# Patient Record
Sex: FEMALE | Race: BLACK OR AFRICAN AMERICAN | NOT HISPANIC OR LATINO | Employment: FULL TIME | URBAN - METROPOLITAN AREA
[De-identification: names, ages, dates, MRNs, and addresses within clinical notes are randomized per-mention and may not be internally consistent; named-entity substitution may affect disease eponyms.]

---

## 2017-01-04 ENCOUNTER — LAB VISIT (OUTPATIENT)
Dept: LAB | Facility: HOSPITAL | Age: 57
End: 2017-01-04
Attending: FAMILY MEDICINE
Payer: COMMERCIAL

## 2017-01-04 ENCOUNTER — OFFICE VISIT (OUTPATIENT)
Dept: FAMILY MEDICINE | Facility: CLINIC | Age: 57
End: 2017-01-04
Payer: COMMERCIAL

## 2017-01-04 VITALS
SYSTOLIC BLOOD PRESSURE: 108 MMHG | HEART RATE: 64 BPM | BODY MASS INDEX: 26.69 KG/M2 | TEMPERATURE: 98 F | DIASTOLIC BLOOD PRESSURE: 62 MMHG | HEIGHT: 68 IN | WEIGHT: 176.13 LBS

## 2017-01-04 DIAGNOSIS — E89.0 POSTABLATIVE HYPOTHYROIDISM: ICD-10-CM

## 2017-01-04 DIAGNOSIS — Z00.00 ANNUAL PHYSICAL EXAM: Primary | ICD-10-CM

## 2017-01-04 DIAGNOSIS — Z12.4 SCREENING FOR CERVICAL CANCER: ICD-10-CM

## 2017-01-04 DIAGNOSIS — Z12.31 OTHER SCREENING MAMMOGRAM: ICD-10-CM

## 2017-01-04 DIAGNOSIS — R73.09 ELEVATED GLUCOSE: ICD-10-CM

## 2017-01-04 DIAGNOSIS — Z12.11 SCREEN FOR COLON CANCER: ICD-10-CM

## 2017-01-04 DIAGNOSIS — E78.00 HYPERCHOLESTEROLEMIA: ICD-10-CM

## 2017-01-04 PROCEDURE — 99396 PREV VISIT EST AGE 40-64: CPT | Mod: S$GLB,,, | Performed by: FAMILY MEDICINE

## 2017-01-04 PROCEDURE — 88175 CYTOPATH C/V AUTO FLUID REDO: CPT

## 2017-01-04 PROCEDURE — 99999 PR PBB SHADOW E&M-EST. PATIENT-LVL III: CPT | Mod: PBBFAC,,, | Performed by: FAMILY MEDICINE

## 2017-01-04 PROCEDURE — 83036 HEMOGLOBIN GLYCOSYLATED A1C: CPT

## 2017-01-04 PROCEDURE — 36415 COLL VENOUS BLD VENIPUNCTURE: CPT | Mod: PO

## 2017-01-04 RX ORDER — PRAVASTATIN SODIUM 40 MG/1
40 TABLET ORAL DAILY
Qty: 90 TABLET | Refills: 3 | Status: SHIPPED | OUTPATIENT
Start: 2017-01-04 | End: 2018-02-21 | Stop reason: SDUPTHER

## 2017-01-04 RX ORDER — LEVOTHYROXINE SODIUM 112 UG/1
112 TABLET ORAL EVERY MORNING
Qty: 90 TABLET | Refills: 3 | Status: SHIPPED | OUTPATIENT
Start: 2017-01-04 | End: 2018-02-21 | Stop reason: SDUPTHER

## 2017-01-04 NOTE — MR AVS SNAPSHOT
[facility]  [address], [address]  [facility] [address]  Phone: [phone]  Fax: [phone]                  Shara Combs   2017 2:00 PM   Office Visit    Description:  Female : 1960   Provider:  Josey Laguerre MD   Department:  [facility]           Reason for Visit     Annual Exam           Diagnoses this Visit        Comments    Annual physical exam    -  Primary     Screening for cervical cancer         Hypercholesterolemia         Postablative hypothyroidism         Elevated glucose         Other screening mammogram                To Do List           Goals (5 Years of Data)     None       These Medications        Disp Refills Start End    levothyroxine (SYNTHROID) 112 MCG tablet 90 tablet 3 2017     Take 1 tablet (112 mcg total) by mouth every morning. - Oral    Pharmacy: [id number] - [facility] [address] Ph #: [phone]       pravastatin (PRAVACHOL) 40 MG tablet 90 tablet 3 2017     Take 1 tablet (40 mg total) by mouth once daily. - Oral    Pharmacy: [id number] - [facility] [address] Ph #: [phone]         [facility]     [facility] Care [facility] -  Assistance  Registered nurses in the [facility] provide clinical advisement, health education, appointment booking, and other advisory services.  Call for this free service at [phone].             Medications           Message regarding Medications     Verify the changes and/or additions to your medication regime listed below are the same as discussed with your clinician today.  If any of these changes or additions are incorrect, please notify your healthcare provider.        CHANGE how you are taking these medications     Start Taking Instead of    pravastatin (PRAVACHOL) 40 MG tablet pravastatin (PRAVACHOL) 40 MG tablet    Dosage:  Take 1 tablet (40 mg total) by mouth  "once daily. Dosage:  TAKE 1 TABLET BY MOUTH EVERY DAY    Reason for Change:  Reorder       STOP taking these medications     fluoxetine (PROZAC) 10 MG capsule Take 1 capsule (10 mg total) by mouth once daily.           Verify that the below list of medications is an accurate representation of the medications you are currently taking.  If none reported, the list may be blank. If incorrect, please contact your healthcare provider. Carry this list with you in case of emergency.           Current Medications     levothyroxine (SYNTHROID) 112 MCG tablet Take 1 tablet (112 mcg total) by mouth every morning.    pravastatin (PRAVACHOL) 40 MG tablet Take 1 tablet (40 mg total) by mouth once daily.           Clinical Reference Information           Vital Signs - Last Recorded  Most recent update: 1/4/2017  2:11 PM by Ana Alex MA    BP Pulse Temp Ht Wt BMI    108/62 (BP Location: Right arm) 64 98.4 °F (36.9 °C) 5' 8" (1.727 m) 79.9 kg (176 lb 2.4 oz) 26.78 kg/m2      Blood Pressure          Most Recent Value    BP  108/62      Allergies as of 1/4/2017     No Known Allergies      Immunizations Administered on Date of Encounter - 1/4/2017     None      Orders Placed During Today's Visit      Normal Orders This Visit    Liquid-based pap smear, screening     Future Labs/Procedures Expected by Expires    Hemoglobin A1c  1/4/2017 1/4/2018    Mammo Digital Screening Bilat with CAD  1/4/2017 3/6/2018      Instructions         I'd like to advise you on current American Cancer Society and American Gynecological Society recommendations. If all previous Pap smears have been normal, no current problems, and no family history of female cancers, it is recommended to have Pap smear every 3 years (or after 29 yo, every 5 years with HPV co-testing). Mammograms every 2-3 years. Of course, I can perform this sooner if any problems or if she desires.    RECOMMENDATIONS FOR FEMALES    Prevent the #1 cause of death- cardiovascular disease (HEART " ATTACK & STROKE) by checking for normal blood pressure, cholesterol, sugars, & by not smoking.     Yearly FLU shot, ONE pneumonia shot after 65, ONE Shingles vaccine after 60    Screening colonoscopy at AGE  50 & every 10 years to check for COLON CANCER,  one of the most common & preventable cancers. Repeat in 3 years if POLYP found    I recommend  high fiber (5 fresh fruits or vegetables daily), low fat diet and aerobic  exercise (huffing/ puffing/ sweating for 20 min straight at least 4 days a week)    Follow up yearly with mammogram (every 2 years) , fasting lipids, CMP, CBC, TSH prior.

## 2017-01-04 NOTE — PATIENT INSTRUCTIONS
I'd like to advise you on current American Cancer Society and American Gynecological Society recommendations. If all previous Pap smears have been normal, no current problems, and no family history of female cancers, it is recommended to have Pap smear every 3 years (or after 29 yo, every 5 years with HPV co-testing). Mammograms every 2-3 years. Of course, I can perform this sooner if any problems or if she desires.    RECOMMENDATIONS FOR FEMALES    Prevent the #1 cause of death- cardiovascular disease (HEART ATTACK & STROKE) by checking for normal blood pressure, cholesterol, sugars, & by not smoking.     Yearly FLU shot, ONE pneumonia shot after 65, ONE Shingles vaccine after 60    Screening colonoscopy at AGE  50 & every 10 years to check for COLON CANCER,  one of the most common & preventable cancers. Repeat in 3 years if POLYP found    I recommend  high fiber (5 fresh fruits or vegetables daily), low fat diet and aerobic  exercise (huffing/ puffing/ sweating for 20 min straight at least 4 days a week)    Follow up yearly with mammogram (every 2 years) , fasting lipids, CMP, CBC, TSH prior.

## 2017-01-04 NOTE — PROGRESS NOTES
"Subjective:       Patient ID: Shara Combs is a 56 y.o. female.    Chief Complaint: Annual Exam    Here today for general exam.     Denies any chest pain, shortness of breath, nausea vomiting constipation diarrhea, blood in stool, heartburn    CBC, CMP, TSH,  within normal limits, except GLUCOSE 117 fasting    Total cholesterol 208,  with pravasttin 40    st opped fluoxetine but seeing therapist    Current Outpatient Prescriptions on File Prior to Visit   Medication Sig         levothyroxine (SYNTHROID) 112 MCG tablet Take 1 tablet (112 mcg total) by mouth every morning.   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   HENT:   Right Ear: External ear normal.   Left Ear: External ear normal.   Nose: Nose normal.   Mouth/Throat: Oropharynx is clear and moist.   Eyes: EOM are normal. Pupils are equal, round, and reactive to light.   Cardiovascular: Normal rate, regular rhythm and normal heart sounds.    No murmur heard.  Pulmonary/Chest: Breath sounds normal. She has no wheezes. She has no rales. Right " breast exhibits no mass, no nipple discharge, no skin change and no tenderness. Left breast exhibits no mass, no nipple discharge, no skin change and no tenderness.   Abdominal: Soft. Bowel sounds are normal. She exhibits no distension and no mass. There is no hepatosplenomegaly. There is no tenderness. There is no rebound and no guarding.   Genitourinary: Vagina normal and uterus normal. No breast tenderness. Pelvic exam was performed with patient supine. Right adnexum displays no mass and no tenderness. Left adnexum displays no mass and no tenderness. No vaginal discharge found.   Lymphadenopathy:     She has no cervical adenopathy.     She has no axillary adenopathy.        Right: No supraclavicular adenopathy present.        Left: No supraclavicular adenopathy present.   Skin: No rash noted.       Assessment:       1. Annual physical exam    2. Screening for cervical cancer    3. Hypercholesterolemia    4. Postablative hypothyroidism    5. Elevated glucose    6. Other screening mammogram    7. Screen for colon cancer        Plan:       Orders Placed This Encounter    Mammo Digital Screening Bilat with CAD    Hemoglobin A1c    Liquid-based pap smear, screening    levothyroxine (SYNTHROID) 112 MCG tablet    pravastatin (PRAVACHOL) 40 MG tablet    Case request GI: COLONOSCOPY       Patient Instructions        I'd like to advise you on current American Cancer Society and American Gynecological Society recommendations. If all previous Pap smears have been normal, no current problems, and no family history of female cancers, it is recommended to have Pap smear every 3 years (or after 29 yo, every 5 years with HPV co-testing). Mammograms every 2-3 years. Of course, I can perform this sooner if any problems or if she desires.    RECOMMENDATIONS FOR FEMALES    Prevent the #1 cause of death- cardiovascular disease (HEART ATTACK & STROKE) by checking for normal blood pressure, cholesterol, sugars, & by not smoking.      Yearly FLU shot, ONE pneumonia shot after 65, ONE Shingles vaccine after 60    Screening colonoscopy at AGE  50 & every 10 years to check for COLON CANCER,  one of the most common & preventable cancers. Repeat in 3 years if POLYP found    I recommend  high fiber (5 fresh fruits or vegetables daily), low fat diet and aerobic  exercise (huffing/ puffing/ sweating for 20 min straight at least 4 days a week)    Follow up yearly with mammogram (every 2 years) , fasting lipids, CMP, CBC, TSH prior.

## 2017-01-05 ENCOUNTER — TELEPHONE (OUTPATIENT)
Dept: FAMILY MEDICINE | Facility: CLINIC | Age: 57
End: 2017-01-05

## 2017-01-05 DIAGNOSIS — R73.09 ELEVATED GLUCOSE: Primary | ICD-10-CM

## 2017-01-05 LAB
ESTIMATED AVG GLUCOSE: 131 MG/DL
HBA1C MFR BLD HPLC: 6.2 %

## 2017-01-05 NOTE — TELEPHONE ENCOUNTER
Hello    Your sugar test is elevated & is in the PRE- DIABETES range 6.2%     If you could walk around the block once a day, your sugars would decrease.     Remember, all white bread, white flour (used in baking)  white pasta, white rice, white potatoes, chips, crackers, cookies, sweets, sodas (even Gatorade, Powerade,Koolaid) , desserts ----TURN INTO SUGAR.     See me in 6 months - with BLOODWORK BEFORE you see me to recheck for DIABETES

## 2017-01-11 ENCOUNTER — TELEPHONE (OUTPATIENT)
Dept: FAMILY MEDICINE | Facility: CLINIC | Age: 57
End: 2017-01-11

## 2017-01-11 NOTE — TELEPHONE ENCOUNTER
Dr. Laguerre's msg reviewed with pt.  Understanding verbalized.  Pt having internet problems and is not able currently to access her [facility] msg.  Offered to schedule f/u appt.  Pt declined stating that she will call back to schedule f/u appt after checking her schedule.

## 2017-01-11 NOTE — TELEPHONE ENCOUNTER
----- Message from Asuncion Jaffe sent at 1/10/2017  4:37 PM CST -----  Contact: [place]/[phone] cell  Type: Test Results    What test was performed?Lab work    Who ordered the test?    When and where were the test performed? 01/04/2017 [place]    Comments:Patient is calling to request test results. Please call and advise.    Thank you!

## 2017-01-11 NOTE — TELEPHONE ENCOUNTER
Hga1c is elevated 6.2%. Follow up with me to discuss prediabetes    Pap smear normal.     Please recommend she check [facility] messages bc I have released her labs & pap results

## 2017-02-08 ENCOUNTER — OFFICE VISIT (OUTPATIENT)
Dept: FAMILY MEDICINE | Facility: CLINIC | Age: 57
End: 2017-02-08
Payer: COMMERCIAL

## 2017-02-08 VITALS
BODY MASS INDEX: 26.6 KG/M2 | HEIGHT: 68 IN | WEIGHT: 175.5 LBS | HEART RATE: 64 BPM | TEMPERATURE: 99 F | SYSTOLIC BLOOD PRESSURE: 112 MMHG | DIASTOLIC BLOOD PRESSURE: 78 MMHG

## 2017-02-08 DIAGNOSIS — R73.03 PREDIABETES: ICD-10-CM

## 2017-02-08 DIAGNOSIS — E78.00 HYPERCHOLESTEROLEMIA: Primary | ICD-10-CM

## 2017-02-08 PROBLEM — R73.09 ELEVATED GLUCOSE: Status: RESOLVED | Noted: 2017-01-04 | Resolved: 2017-02-08

## 2017-02-08 LAB
CREAT UR-MCNC: 121 MG/DL
MICROALBUMIN UR DL<=1MG/L-MCNC: 4 UG/ML
MICROALBUMIN/CREATININE RATIO: 3.3 UG/MG

## 2017-02-08 PROCEDURE — 99999 PR PBB SHADOW E&M-EST. PATIENT-LVL III: CPT | Mod: PBBFAC,,, | Performed by: FAMILY MEDICINE

## 2017-02-08 PROCEDURE — 82570 ASSAY OF URINE CREATININE: CPT

## 2017-02-08 PROCEDURE — 99214 OFFICE O/P EST MOD 30 MIN: CPT | Mod: S$GLB,,, | Performed by: FAMILY MEDICINE

## 2017-02-08 NOTE — PROGRESS NOTES
Subjective:      Patient ID: Shara Combs is a 56 y.o. female.    Chief Complaint: Results    Here today for new diagnosis prediabetes. Her mom had diabetes    Denies acute symptoms such as nocturia, polyuria, unexplained weight loss or blurred vision.    Last eye evaluation years    Last urine microalbumin today    Denies numbness, tingling sensation, or known h/o peripheral neuropathy.     She is taking pravastatin 40    Lab Results   Component Value Date    HGBA1C 6.2 2017     No results found for: MICROALBUR  Lab Results   Component Value Date    LDLCALC 127.4 2016    LDLCALC 129.8 2014    CHOL 208 (H) 2016    HDL 51 2016    TRIG 148 2016       Lab Results   Component Value Date     2016    K 3.9 2016     2016    CO2 27 2016     (H) 2016    BUN 14 2016    CREATININE 0.9 2016    CALCIUM 9.3 2016    PROT 7.4 2016    ALBUMIN 3.8 2016    BILITOT 0.2 2016    ALKPHOS 72 2016    AST 36 2016    ALT 23 2016    ANIONGAP 8 2016    ESTGFRAFRICA >60.0 2016    EGFRNONAA >60.0 2016    WBC 7.12 2016    HGB 11.7 (L) 2016    HCT 37.4 2016    MCV 69 (L) 2016     2016    TSH 2.284 2016         Current Outpatient Prescriptions on File Prior to Visit   Medication Sig    levothyroxine (SYNTHROID) 112 MCG tablet Take 1 tablet (112 mcg total) by mouth every morning.    pravastatin (PRAVACHOL) 40 MG tablet Take 1 tablet (40 mg total) by mouth once daily.     No current facility-administered medications on file prior to visit.      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 She appears well-developed and well-nourished.   HENT:   Head: Normocephalic and atraumatic.   Right Ear: External ear normal.   Left Ear: External ear normal.   Nose: Nose normal.   Mouth/Throat: Oropharynx is clear and moist.   Eyes: EOM are normal. Pupils are equal, round, and reactive to light.   Neck: Neck supple. No thyromegaly present.   Cardiovascular: Normal rate, regular rhythm, normal heart sounds and intact distal pulses.    No murmur heard.  Pulmonary/Chest: Effort normal and breath sounds normal. She has no wheezes.   Abdominal: Soft. Bowel sounds are normal. She exhibits no distension and no mass. There is no tenderness. There is no rebound and no guarding.   Musculoskeletal: She exhibits no edema.        Right foot: There is normal range of motion and no deformity.        Left foot: There is normal range of motion and no deformity.        Feet:   Right Foot:   Protective Sensation: 5 sites tested. 5 sites sensed.   Skin Integrity: Negative for ulcer, blister, skin breakdown, erythema or warmth.   Left Foot:   Protective Sensation: 5 sites tested. 5 sites sensed.   Skin Integrity: Negative for ulcer, blister, skin breakdown, erythema or warmth.   Lymphadenopathy:     She has no cervical adenopathy.   Neurological: She is alert and oriented to person, place, and time.   Skin: Skin is warm and dry.   Psychiatric: She has a " normal mood and affect. Her behavior is normal.     Assessment:     1. Hypercholesterolemia    2. Prediabetes      Plan:     Orders Placed This Encounter    Hemoglobin A1c     3 months of focusing on diet & exercise.     Consider diabetic teaching if Hga1c is elevated at follow up    Goal LDL < 100  Consider ACE   Patient Instructions     ===================================  Continue other medications    The future risks of uncontrolled diabetes - include heart attack, stroke, neuropathy (pain in hands & feet that could lead to infection and amputation), nephropathy (leading to kidney dialysis) , and blindness     Always wear protective shoes.     Focus on the American Diabetic Association diet, high fiber, low fat diet, exercise  - huffing & puffing for 20 minutes most days of the week    Focus on NO SUGAR and no sugar substitutes (splenda, etc) IN YOUR DRINKS. With respect to food - LOW CARBOHYDRATES - switch to whole wheat & brown rice.    Decrease & try to stop ALCOHOL, WHITE BREAD, WHITE PASTA, WHITE RICE , WHITE POTATOES- which all turn into sugar.    EAT an EXTRA VEGETABLE A DAY than you already do.    The ADA recommends taking ----------(we will discuss this at upcoming visits)  1. Aspirin 81 mg daily (unless you have had bleeding stomach ulcers or allergy to this)  2. STATIN medication (atorvastatin, pravastatin, rosuvastatin) to decrease inflammation in your blood vessels caused by SUGAR to prevent future heart attack & stroke. This is recommended even if your LDL cholesterol is <100.   3. ACE/ ARB blood pressure medication (Losartan, Lisinopril) to protect your kidneys from future dialysis from SUGAR. This is recommended even if you have normal blood pressure    Once YEARLY I will check basic labs CBC, CMP, fasting lipids, TSH, Hga1C prior to your visit      ----------------------------

## 2017-02-08 NOTE — PATIENT INSTRUCTIONS
FOLLOW UP REMINDER for DIABETICS:  ===================================  If you have DIABETES, we should evaluate your blood test every 3 MONTHS if your Hg1c is >6.5% OR if you use INSULIN.     We can evaluate you every 6 MONTHS if your Hga1c is < 6.5% (contolled)  ===================================  Continue other medications    The future risks of uncontrolled diabetes - include heart attack, stroke, neuropathy (pain in hands & feet that could lead to infection and amputation), nephropathy (leading to kidney dialysis) , and blindness     To prevent complications that can be treated early, please see your  opthalmologist EYE DOCTOR YEARLY      Always wear protective shoes.     Focus on the American Diabetic Association diet, high fiber, low fat diet, exercise  - huffing & puffing for 20 minutes most days of the week    Focus on NO SUGAR and no sugar substitutes (splenda, etc) IN YOUR DRINKS. With respect to food - LOW CARBOHYDRATES - switch to whole wheat & brown rice.    Decrease & try to stop ALCOHOL, WHITE BREAD, WHITE PASTA, WHITE RICE , WHITE POTATOES- which all turn into sugar.    EAT an EXTRA VEGETABLE A DAY than you already do.    The ADA recommends taking  1. Aspirin 81 mg daily (unless you have had bleeding stomach ulcers or allergy to this)  2. STATIN medication (atorvastatin, pravastatin, rosuvastatin) to decrease inflammation in your blood vessels caused by SUGAR to prevent future heart attack & stroke. This is recommended even if your LDL cholesterol is <100.   3. ACE/ ARB blood pressure medication (Losartan, Lisinopril) to protect your kidneys from future dialysis from SUGAR. This is recommended even if you have normal blood pressure    Once YEARLY I will check basic labs CBC, CMP, fasting lipids, TSH, Hga1C prior to your visit      ----------------------------

## 2017-02-08 NOTE — MR AVS SNAPSHOT
"    [facility]  [address], [address]  [facility] [address]  Phone: [phone]  Fax: [phone]                  Shara Combs   2017 8:20 AM   Office Visit    Description:  Female : 1960   Provider:  Josey Laguerre MD   Department:  [facility]           Reason for Visit     Results           Diagnoses this Visit        Comments    Prediabetes                To Do List           Goals (5 Years of Data)     None      Follow-Up and Disposition     Return in about 3 months (around 2017) for prediabetes, Hga1c prior.      [facility]     [facility] Care [facility] -  Assistance  Registered nurses in the [facility] provide clinical advisement, health education, appointment booking, and other advisory services.  Call for this free service at [phone].             Medications           Message regarding Medications     Verify the changes and/or additions to your medication regime listed below are the same as discussed with your clinician today.  If any of these changes or additions are incorrect, please notify your healthcare provider.             Verify that the below list of medications is an accurate representation of the medications you are currently taking.  If none reported, the list may be blank. If incorrect, please contact your healthcare provider. Carry this list with you in case of emergency.           Current Medications     levothyroxine (SYNTHROID) 112 MCG tablet Take 1 tablet (112 mcg total) by mouth every morning.    pravastatin (PRAVACHOL) 40 MG tablet Take 1 tablet (40 mg total) by mouth once daily.           Clinical Reference Information           Your Vitals Were     BP Pulse Temp Height Weight BMI    112/78 (BP Location: Right arm) 64 98.7 °F (37.1 °C) 5' 7.5" (1.715 m) 79.6 kg (175 lb 7.8 oz) 27.08 kg/m2      Blood Pressure          Most Recent Value    BP  112/78      Allergies as of " 2/8/2017     No Known Allergies      Immunizations Administered on Date of Encounter - 2/8/2017     None      Orders Placed During Today's Visit     Future Labs/Procedures Expected by Expires    Hemoglobin A1c  5/8/2017 4/9/2018      Instructions        FOLLOW UP REMINDER for DIABETICS:  ===================================  If you have DIABETES, we should evaluate your blood test every 3 MONTHS if your Hg1c is >6.5% OR if you use INSULIN.     We can evaluate you every 6 MONTHS if your Hga1c is < 6.5% (contolled)  ===================================  Continue other medications    The future risks of uncontrolled diabetes - include heart attack, stroke, neuropathy (pain in hands & feet that could lead to infection and amputation), nephropathy (leading to kidney dialysis) , and blindness     To prevent complications that can be treated early, please see your  opthalmologist EYE DOCTOR YEARLY      Always wear protective shoes.     Focus on the American Diabetic Association diet, high fiber, low fat diet, exercise  - huffing & puffing for 20 minutes most days of the week    Focus on NO SUGAR and no sugar substitutes (splenda, etc) IN YOUR DRINKS. With respect to food - LOW CARBOHYDRATES - switch to whole wheat & brown rice.    Decrease & try to stop ALCOHOL, WHITE BREAD, WHITE PASTA, WHITE RICE , WHITE POTATOES- which all turn into sugar.    EAT an EXTRA VEGETABLE A DAY than you already do.    The ADA recommends taking  1. Aspirin 81 mg daily (unless you have had bleeding stomach ulcers or allergy to this)  2. STATIN medication (atorvastatin, pravastatin, rosuvastatin) to decrease inflammation in your blood vessels caused by SUGAR to prevent future heart attack & stroke. This is recommended even if your LDL cholesterol is <100.   3. ACE/ ARB blood pressure medication (Losartan, Lisinopril) to protect your kidneys from future dialysis from SUGAR. This is recommended even if you have normal blood pressure    Once YEARLY I  will check basic labs CBC, CMP, fasting lipids, TSH, Hga1C prior to your visit      ----------------------------             Language Assistance Services     ATTENTION: Language assistance services are available, free of charge. Please call [phone].      ATENCIÓN: Si habla eyal, tiene a belle disposición servicios gratuitos de asistencia lingüística. Llame al [phone].     ROGER Ý: N?u b?n nói Ti?ng Vi?t, có các d?ch v? h? tr? ngôn ng? mi?n phí dành cho b?n. G?i s? [phone].         [facility] complies with applicable Federal civil rights laws and does not discriminate on the basis of race, color, national origin, age, disability, or sex.

## 2017-05-04 ENCOUNTER — LAB VISIT (OUTPATIENT)
Dept: LAB | Facility: HOSPITAL | Age: 57
End: 2017-05-04
Attending: FAMILY MEDICINE

## 2017-05-04 DIAGNOSIS — R73.03 PREDIABETES: ICD-10-CM

## 2017-05-04 LAB
ESTIMATED AVG GLUCOSE: 134 MG/DL
HBA1C MFR BLD HPLC: 6.3 %

## 2017-05-04 PROCEDURE — 36415 COLL VENOUS BLD VENIPUNCTURE: CPT

## 2017-05-04 PROCEDURE — 83036 HEMOGLOBIN GLYCOSYLATED A1C: CPT

## 2017-05-09 ENCOUNTER — OFFICE VISIT (OUTPATIENT)
Dept: FAMILY MEDICINE | Facility: CLINIC | Age: 57
End: 2017-05-09
Payer: COMMERCIAL

## 2017-05-09 VITALS
SYSTOLIC BLOOD PRESSURE: 114 MMHG | HEIGHT: 66 IN | HEART RATE: 68 BPM | WEIGHT: 165.38 LBS | DIASTOLIC BLOOD PRESSURE: 84 MMHG | TEMPERATURE: 98 F | BODY MASS INDEX: 26.58 KG/M2

## 2017-05-09 DIAGNOSIS — E78.00 HYPERCHOLESTEROLEMIA: ICD-10-CM

## 2017-05-09 PROBLEM — R73.03 PREDIABETES: Status: RESOLVED | Noted: 2017-02-08 | Resolved: 2017-05-09

## 2017-05-09 PROCEDURE — 4010F ACE/ARB THERAPY RXD/TAKEN: CPT | Mod: S$GLB,,, | Performed by: FAMILY MEDICINE

## 2017-05-09 PROCEDURE — 99999 PR PBB SHADOW E&M-EST. PATIENT-LVL III: CPT | Mod: PBBFAC,,, | Performed by: FAMILY MEDICINE

## 2017-05-09 PROCEDURE — 99212 OFFICE O/P EST SF 10 MIN: CPT | Mod: S$GLB,,, | Performed by: FAMILY MEDICINE

## 2017-05-09 PROCEDURE — 1160F RVW MEDS BY RX/DR IN RCRD: CPT | Mod: S$GLB,,, | Performed by: FAMILY MEDICINE

## 2017-05-09 PROCEDURE — 3044F HG A1C LEVEL LT 7.0%: CPT | Mod: S$GLB,,, | Performed by: FAMILY MEDICINE

## 2017-05-09 RX ORDER — LOSARTAN POTASSIUM 25 MG/1
25 TABLET ORAL DAILY
Qty: 30 TABLET | Refills: 12 | Status: SHIPPED | OUTPATIENT
Start: 2017-05-09 | End: 2017-09-08 | Stop reason: SDUPTHER

## 2017-05-09 RX ORDER — METFORMIN HYDROCHLORIDE 500 MG/1
500 TABLET, EXTENDED RELEASE ORAL
Qty: 30 TABLET | Refills: 12 | Status: SHIPPED | OUTPATIENT
Start: 2017-05-09 | End: 2017-07-25 | Stop reason: SDUPTHER

## 2017-05-09 NOTE — MR AVS SNAPSHOT
[facility]  [address], [address]  [facility] [address]  Phone: [phone]  Fax: [phone]                  Shara Tayloriggins   2017 3:20 PM   Office Visit    Description:  Female : 1960   Provider:  Josey Laguerre MD   Department:  [facility]           Reason for Visit     Follow-up           Diagnoses this Visit        Comments    Hypercholesterolemia    -  Primary     Uncontrolled type 2 diabetes mellitus without complication, without long-term current use of insulin                To Do List           Goals (5 Years of Data)     None      Follow-Up and Disposition     Return in about 3 months (around 2017) for dm, juan STACY.       These Medications        Disp Refills Start End    losartan (COZAAR) 25 MG tablet 30 tablet 12 2017    Take 1 tablet (25 mg total) by mouth once daily. - Oral    Pharmacy: [facility]/pharmacy #[id number] [place], [place] - [address] Ph #: [phone]       metformin (GLUCOPHAGE-XR) 500 MG 24 hr tablet 30 tablet 12 2017    Take 1 tablet (500 mg total) by mouth daily with breakfast. - Oral    Pharmacy: [facility]/pharmacy #[id number] [place], [place] - [address] Ph #: [phone]         [facility]     [facility] -  Assistance  Unless otherwise directed by your provider, please contact [facility], our nurse care line that is available for  assistance.     Registered nurses in the [facility] provide: appointment scheduling, clinical advisement, health education, and other advisory services.  Call: [phone] (toll free)               Medications           Message regarding Medications     Verify the changes and/or additions to your medication regime listed below are the same as discussed with your clinician today.  If any of these changes or additions are incorrect, please notify your healthcare provider.        START taking these  "NEW medications        Refills    losartan (COZAAR) 25 MG tablet 12    Sig: Take 1 tablet (25 mg total) by mouth once daily.    Class: Normal    Route: Oral    metformin (GLUCOPHAGE-XR) 500 MG 24 hr tablet 12    Sig: Take 1 tablet (500 mg total) by mouth daily with breakfast.    Class: Normal    Route: Oral           Verify that the below list of medications is an accurate representation of the medications you are currently taking.  If none reported, the list may be blank. If incorrect, please contact your healthcare provider. Carry this list with you in case of emergency.           Current Medications     levothyroxine (SYNTHROID) 112 MCG tablet Take 1 tablet (112 mcg total) by mouth every morning.    losartan (COZAAR) 25 MG tablet Take 1 tablet (25 mg total) by mouth once daily.    metformin (GLUCOPHAGE-XR) 500 MG 24 hr tablet Take 1 tablet (500 mg total) by mouth daily with breakfast.    pravastatin (PRAVACHOL) 40 MG tablet Take 1 tablet (40 mg total) by mouth once daily.           Clinical Reference Information           Your Vitals Were     BP Pulse Temp Height Weight BMI    114/84 (BP Location: Right arm) 68 98.4 °F (36.9 °C) 5' 6" (1.676 m) 75 kg (165 lb 5.5 oz) 26.69 kg/m2      Blood Pressure          Most Recent Value    BP  114/84      Allergies as of 5/9/2017     No Known Allergies      Immunizations Administered on Date of Encounter - 5/9/2017     None      Orders Placed During Today's Visit      Normal Orders This Visit    Ambulatory Referral to Diabetes Education     Future Labs/Procedures Expected by Expires    Hemoglobin A1c  8/9/2017 7/8/2018      Instructions        FOLLOW UP REMINDER for DIABETICS:  ===================================  If you have DIABETES, we should evaluate your blood test every 3 MONTHS if your Hg1c is >6.5% OR if you use INSULIN.     We can evaluate you every 6 MONTHS if your Hga1c is < 6.5% (contolled)  ===================================  Continue other medications    The " future risks of uncontrolled diabetes - include heart attack, stroke, neuropathy (pain in hands & feet that could lead to infection and amputation), nephropathy (leading to kidney dialysis) , and blindness     To prevent complications that can be treated early, please see your  opthalmologist EYE DOCTOR YEARLY      Always wear protective shoes.     Focus on the American Diabetic Association diet, high fiber, low fat diet, exercise  - huffing & puffing for 20 minutes most days of the week    Focus on NO SUGAR and no sugar substitutes (splenda, etc) IN YOUR DRINKS. With respect to food - LOW CARBOHYDRATES - switch to whole wheat & brown rice.    Decrease & try to stop ALCOHOL, WHITE BREAD, WHITE PASTA, WHITE RICE , WHITE POTATOES- which all turn into sugar.    EAT an EXTRA VEGETABLE A DAY than you already do.    The ADA recommends taking  1. Aspirin 81 mg daily (unless you have had bleeding stomach ulcers or allergy to this)  2. STATIN medication (atorvastatin, pravastatin, rosuvastatin) to decrease inflammation in your blood vessels caused by SUGAR to prevent future heart attack & stroke. This is recommended even if your LDL cholesterol is <100.   3. ACE/ ARB blood pressure medication (Losartan, Lisinopril) to protect your kidneys from future dialysis from SUGAR. This is recommended even if you have normal blood pressure    Once YEARLY I will check basic labs CBC, CMP, fasting lipids, TSH, Hga1C prior to your visit      ----------------------------             Language Assistance Services     ATTENTION: Language assistance services are available, free of charge. Please call [phone].      ATENCIÓN: Si habla eyal, tiene a belle disposición servicios gratuitos de asistencia lingüística. Llame al [phone].     CHÚ Ý: N?u b?n nói Ti?ng Vi?t, có các d?ch v? h? tr? ngôn ng? mi?n phí dành cho b?n. G?i s? [phone].         [facility] complies with applicable Federal civil rights laws and  does not discriminate on the basis of race, color, national origin, age, disability, or sex.

## 2017-05-09 NOTE — PROGRESS NOTES
Subjective:      Patient ID: Shara Combs is a 56 y.o. female.    Chief Complaint: Follow-up (3 month)    Here today for diabetes mellitus 6.3% , new dx. she is frustrated because she has been exercising 4 days a week in the gym, biking, eating less bread, pasta, rice, soda.    Denies acute symptoms such as nocturia, polyuria, unexplained weight loss or blurred vision.    Last eye evaluation years    Last urine microalbumin normal Feb 2017    Denies numbness, tingling sensation, or known h/o peripheral neuropathy.     Denies  Chest pain, shortness of breath.      Lab Results   Component Value Date    HGBA1C 6.3 (H) 05/04/2017    HGBA1C 6.2 01/04/2017     No results found for: MICROALBUR  Lab Results   Component Value Date    LDLCALC 127.4 12/28/2016    LDLCALC 129.8 01/31/2014    CHOL 208 (H) 12/28/2016    HDL 51 12/28/2016    TRIG 148 12/28/2016       Lab Results   Component Value Date     12/28/2016    K 3.9 12/28/2016     12/28/2016    CO2 27 12/28/2016     (H) 12/28/2016    BUN 14 12/28/2016    CREATININE 0.9 12/28/2016    CALCIUM 9.3 12/28/2016    PROT 7.4 12/28/2016    ALBUMIN 3.8 12/28/2016    BILITOT 0.2 12/28/2016    ALKPHOS 72 12/28/2016    AST 36 12/28/2016    ALT 23 12/28/2016    ANIONGAP 8 12/28/2016    ESTGFRAFRICA >60.0 12/28/2016    EGFRNONAA >60.0 12/28/2016    WBC 7.12 12/28/2016    HGB 11.7 (L) 12/28/2016    HCT 37.4 12/28/2016    MCV 69 (L) 12/28/2016     12/28/2016    TSH 2.284 12/28/2016         Current Outpatient Prescriptions on File Prior to Visit   Medication Sig    levothyroxine (SYNTHROID) 112 MCG tablet Take 1 tablet (112 mcg total) by mouth every morning.    pravastatin (PRAVACHOL) 40 MG tablet Take 1 tablet (40 mg total) by mouth once daily.     No current facility-administered medications on file prior to visit.      Past Medical History:   Diagnosis Date    Anxiety 05/17/2016    seeing therapist    Elevated glucose 1/4/2017     "Hyperlipidemia     Hypothyroidism     after tx with iodine    Prediabetes 2017    Uncontrolled type 2 diabetes mellitus without complication, without long-term current use of insulin 2017     Past Surgical History:   Procedure Laterality Date     SECTION, CLASSIC       Social History     Social History Narrative    [employer] , , 18yo son, returned to her home in [facility], nonsmoker, consumes ETOH 1-2x/month      GYN here. Never had colonoscopy                  Family History   Problem Relation Age of Onset    Hypertension Father     Stroke Father     Thyroid disease Sister      Vitals:    17 1523   BP: 114/84   Pulse: 68   Temp: 98.4 °F (36.9 °C)   Weight: 75 kg (165 lb 5.5 oz)   Height: 5' 6" (1.676 m)   PainSc: 0-No pain     Objective:   Physical Exam   Constitutional: She is oriented to person, place, and time. She appears well-developed and well-nourished.   HENT:   Head: Normocephalic and atraumatic.   Right Ear: External ear normal.   Left Ear: External ear normal.   Nose: Nose normal.   Mouth/Throat: Oropharynx is clear and moist.   Eyes: EOM are normal. Pupils are equal, round, and reactive to light.   Neck: Neck supple. No thyromegaly present.   Cardiovascular: Normal rate, regular rhythm, normal heart sounds and intact distal pulses.    No murmur heard.  Pulmonary/Chest: Effort normal and breath sounds normal. She has no wheezes.   Abdominal: Soft. Bowel sounds are normal. She exhibits no distension and no mass. There is no tenderness. There is no rebound and no guarding.   Musculoskeletal: She exhibits no edema.        Right foot: There is normal range of motion and no deformity.        Left foot: There is normal range of motion and no deformity.        Feet:   Right Foot:   Protective Sensation: 5 sites tested. 5 sites sensed.   Skin Integrity: Negative for ulcer, blister, skin breakdown, erythema or warmth.   Left Foot:   Protective Sensation: 5 " sites tested. 5 sites sensed.   Skin Integrity: Negative for ulcer, blister, skin breakdown, erythema or warmth.   Lymphadenopathy:     She has no cervical adenopathy.   Neurological: She is alert and oriented to person, place, and time.   Skin: Skin is warm and dry.   Psychiatric: She has a normal mood and affect. Her behavior is normal.     Assessment:     1. Uncontrolled type 2 diabetes mellitus without complication, without long-term current use of insulin    2. Hypercholesterolemia      Plan:     Orders Placed This Encounter    Ambulatory Referral to Diabetes Education    losartan (COZAAR) 25 MG tablet    metformin (GLUCOPHAGE-XR) 500 MG 24 hr tablet       Patient Instructions       FOLLOW UP REMINDER for DIABETICS:  ===================================    With your new onset of Diabetes, please see me in  3 MONTHS     ===================================  Continue other medications    The future risks of uncontrolled diabetes - include heart attack, stroke, neuropathy (pain in hands & feet that could lead to infection and amputation), nephropathy (leading to kidney dialysis) , and blindness     To prevent complications that can be treated early, please see your  opthalmologist EYE DOCTOR YEARLY      Always wear protective shoes.     Focus on the American Diabetic Association diet, high fiber, low fat diet, exercise  - huffing & puffing for 20 minutes most days of the week    Focus on NO SUGAR and no sugar substitutes (splenda, etc) IN YOUR DRINKS. With respect to food - LOW CARBOHYDRATES - switch to whole wheat & brown rice.    Decrease & try to stop ALCOHOL, WHITE BREAD, WHITE PASTA, WHITE RICE , WHITE POTATOES- which all turn into sugar.    EAT an EXTRA VEGETABLE A DAY than you already do.    The ADA recommends taking  1. Aspirin 81 mg daily (unless you have had bleeding stomach ulcers or allergy to this)  2. STATIN medication (atorvastatin, pravastatin, rosuvastatin) to decrease inflammation in your blood  vessels caused by SUGAR to prevent future heart attack & stroke. This is recommended even if your LDL cholesterol is <100.   3. ACE/ ARB blood pressure medication (Losartan, Lisinopril) to protect your kidneys from future dialysis from SUGAR. This is recommended even if you have normal blood pressure    Once YEARLY I will check basic labs CBC, CMP, fasting lipids, TSH, Hga1C prior to your visit      ----------------------------

## 2017-06-01 ENCOUNTER — CLINICAL SUPPORT (OUTPATIENT)
Dept: DIABETES | Facility: CLINIC | Age: 57
End: 2017-06-01
Payer: COMMERCIAL

## 2017-06-01 PROCEDURE — G0108 DIAB MANAGE TRN  PER INDIV: HCPCS | Mod: S$GLB,,, | Performed by: DIETITIAN, REGISTERED

## 2017-06-01 NOTE — PROGRESS NOTES
Diabetes Education  Author: Jasmin Rice RD, CDE  Date: 6/1/2017    Diabetes Education Visit  Diabetes Education Record Assessment/Progress: Initial    Diabetes Type  Diabetes Type : Type II    Diabetes History  Diabetes Diagnosis: 0-1 year    Nutrition  Meal Planning: water, 3 meals per day, eats out seldom, snacks between meal (since dx has cut out most starches (bread, rice, pasta))  Meal Plan 24 Hour Recall - Breakfast: 1 packet of oatmeal OR 2 boiled eggs  Meal Plan 24 Hour Recall - Lunch: salad with chix OR sandwich  Meal Plan 24 Hour Recall - Dinner: meat and veg  Meal Plan 24 Hour Recall - Snack: sometimes will snack on fruit    Monitoring   Blood Glucose Logs: No (Not currently SMBG - will provide meter and training today)    Exercise   Exercise Type: bike riding, use exercise equipment  Intensity: Moderate  Frequency: Daily  Duration: 1 hour    Current Diabetes Treatment   Current Treatment: Oral Medication, Diet, Exercise    Social History  Preferred Learning Method: Face to Face, Demonstration, Hands On, Reading Materials  Primary Support: Self, Spouse  Smoking Status: Never a Smoker  Alcohol Use: Never    Barriers to Change  Barriers to Change: None  Learning Challenges : None    Readiness to Learn   Readiness to Learn : Acceptance    Diabetes Education Assessment/Progress  Acute Complications (preventing, detecting, and treating acute complications): Discussion, Individual Session, Written Materials Provided, Competent (verbalizes/demonstrates)    Diabetes Disease Process (diabetes disease process and treatment options): Discussion, Individual Session, Written Materials Provided, Competent (verbalizes/demonstrates)    Nutrition (Incorporating nutritional management into one's lifestyle): Discussion, Individual Session, Written Materials Provided, Instructed, Competent (verbalizes/demonstrates) (Instructed on carb sources, timing and spacing of meals and snacks, importance of portion control,  label reading and rec carbs for meals and snacks. Rec 30-45 grams CHO for meals and 0-5 grams for snacks)    Physical Activity (incorporating physical activity into one's lifestyle): Competent (verbalizes/demonstrates)    Medications (states correct name, dose, onset, peak, duration, side effects & timing of meds): Discussion, Individual Session, Written Materials Provided, Competent (verbalizes/demonstrates)    Monitoring (monitoring blood glucose/other parameters & using results): Demonstration, Discussion, Return Demonstration, Individual Session, Written Materials Provided, Instructed, Competent (verbalizes/demonstrates) (Pt has insurance that I am not familiar with, so I was unable to provider her with a BG meter that I knew would be covered by her insurance. Used a demo meter to show pt how to SMBG and instructed her to purchase a [employer]ion meter and testing supplies and provided BG logs and instructed to SMBG every other day and to bring logs to f/u visit in 3 mo)    Goal Setting and Problem Solving (verbalizes behavior change strategies & sets realistic goals): Discussion, Individual Session, Written Materials Provided, Competent (verbalizes/demonstrates)    Behavior Change (developing personal strategies to health & behavior change): Discussion, Individual Session, Written Materials Provided    Psychosocial Issues (developing personal srategies to address psychosocial concerns): Discussion, Individual Session, Written Materials Provided    Goals  Monitoring: Set (Pt will SMBG every other day and will bring logs to f/u visit)  Start Date: 06/01/17  Target Date: 09/01/17  Other: Set (Continue current plan of care with diet and exercise)  Start Date: 06/01/17    Diabetes Care Plan/Intervention  Education Plan/Intervention: Individual Follow-Up DSMT    Education Units of Time   Time Spent: 60 min      Health Maintenance Topics with due status: Not Due       Topic Last Completion Date    Lipid Panel 12/28/2016     Pap Smear with HPV Cotest 01/04/2017    Influenza Vaccine Not Due     Health Maintenance Due   Topic Date Due    TETANUS VACCINE  09/09/1979    Colonoscopy  09/09/2011    Mammogram  01/21/2013

## 2017-06-12 ENCOUNTER — HOSPITAL ENCOUNTER (EMERGENCY)
Facility: OTHER | Age: 57
Discharge: PSYCHIATRIC OR SUBSTANCE USE FACILITY | End: 2017-06-12
Attending: EMERGENCY MEDICINE
Payer: COMMERCIAL

## 2017-06-12 VITALS
OXYGEN SATURATION: 98 % | SYSTOLIC BLOOD PRESSURE: 122 MMHG | TEMPERATURE: 99 F | HEART RATE: 60 BPM | WEIGHT: 168 LBS | HEIGHT: 67 IN | DIASTOLIC BLOOD PRESSURE: 74 MMHG | RESPIRATION RATE: 16 BRPM | BODY MASS INDEX: 26.37 KG/M2

## 2017-06-12 DIAGNOSIS — F32.2 SEVERE DEPRESSION: Primary | ICD-10-CM

## 2017-06-12 LAB
AMPHET+METHAMPHET UR QL: NEGATIVE
ANION GAP SERPL CALC-SCNC: 10 MMOL/L
ANISOCYTOSIS BLD QL SMEAR: SLIGHT
APAP SERPL-MCNC: <3 UG/ML
BARBITURATES UR QL SCN>200 NG/ML: NEGATIVE
BASOPHILS # BLD AUTO: 0.02 K/UL
BASOPHILS NFR BLD: 0.3 %
BENZODIAZ UR QL SCN>200 NG/ML: NEGATIVE
BILIRUB UR QL STRIP: ABNORMAL
BUN SERPL-MCNC: 10 MG/DL
BZE UR QL SCN: NEGATIVE
CALCIUM SERPL-MCNC: 10.3 MG/DL
CANNABINOIDS UR QL SCN: ABNORMAL
CHLORIDE SERPL-SCNC: 106 MMOL/L
CLARITY UR: CLEAR
CO2 SERPL-SCNC: 25 MMOL/L
COLOR UR: YELLOW
CREAT SERPL-MCNC: 0.9 MG/DL
CREAT UR-MCNC: 400.4 MG/DL
DIFFERENTIAL METHOD: ABNORMAL
EOSINOPHIL # BLD AUTO: 0.1 K/UL
EOSINOPHIL NFR BLD: 0.8 %
ERYTHROCYTE [DISTWIDTH] IN BLOOD BY AUTOMATED COUNT: 15.5 %
EST. GFR  (AFRICAN AMERICAN): >60 ML/MIN/1.73 M^2
EST. GFR  (NON AFRICAN AMERICAN): >60 ML/MIN/1.73 M^2
ETHANOL SERPL-MCNC: <10 MG/DL
GLUCOSE SERPL-MCNC: 87 MG/DL
GLUCOSE UR QL STRIP: NEGATIVE
HCT VFR BLD AUTO: 38.4 %
HGB BLD-MCNC: 12.3 G/DL
HGB UR QL STRIP: ABNORMAL
HYPOCHROMIA BLD QL SMEAR: ABNORMAL
KETONES UR QL STRIP: ABNORMAL
LEUKOCYTE ESTERASE UR QL STRIP: NEGATIVE
LYMPHOCYTES # BLD AUTO: 2.4 K/UL
LYMPHOCYTES NFR BLD: 37.9 %
MCH RBC QN AUTO: 21.7 PG
MCHC RBC AUTO-ENTMCNC: 32 %
MCV RBC AUTO: 68 FL
METHADONE UR QL SCN>300 NG/ML: NEGATIVE
MONOCYTES # BLD AUTO: 0.3 K/UL
MONOCYTES NFR BLD: 5 %
NEUTROPHILS # BLD AUTO: 3.6 K/UL
NEUTROPHILS NFR BLD: 56 %
NITRITE UR QL STRIP: NEGATIVE
OPIATES UR QL SCN: NEGATIVE
OVALOCYTES BLD QL SMEAR: ABNORMAL
PCP UR QL SCN>25 NG/ML: NEGATIVE
PH UR STRIP: 6 [PH] (ref 5–8)
PLATELET # BLD AUTO: 190 K/UL
PLATELET BLD QL SMEAR: ABNORMAL
PMV BLD AUTO: ABNORMAL FL
POIKILOCYTOSIS BLD QL SMEAR: SLIGHT
POLYCHROMASIA BLD QL SMEAR: ABNORMAL
POTASSIUM SERPL-SCNC: 3.7 MMOL/L
PROT UR QL STRIP: NEGATIVE
RBC # BLD AUTO: 5.67 M/UL
SALICYLATES SERPL-MCNC: <5 MG/DL
SODIUM SERPL-SCNC: 141 MMOL/L
SP GR UR STRIP: >=1.03 (ref 1–1.03)
TOXICOLOGY INFORMATION: ABNORMAL
TSH SERPL DL<=0.005 MIU/L-ACNC: 0.7 UIU/ML
URN SPEC COLLECT METH UR: ABNORMAL
UROBILINOGEN UR STRIP-ACNC: NEGATIVE EU/DL
WBC # BLD AUTO: 6.42 K/UL

## 2017-06-12 PROCEDURE — 80320 DRUG SCREEN QUANTALCOHOLS: CPT

## 2017-06-12 PROCEDURE — 80048 BASIC METABOLIC PNL TOTAL CA: CPT

## 2017-06-12 PROCEDURE — 81003 URINALYSIS AUTO W/O SCOPE: CPT

## 2017-06-12 PROCEDURE — 85025 COMPLETE CBC W/AUTO DIFF WBC: CPT

## 2017-06-12 PROCEDURE — 80307 DRUG TEST PRSMV CHEM ANLYZR: CPT | Mod: 59

## 2017-06-12 PROCEDURE — 25000003 PHARM REV CODE 250: Performed by: EMERGENCY MEDICINE

## 2017-06-12 PROCEDURE — 99285 EMERGENCY DEPT VISIT HI MDM: CPT

## 2017-06-12 PROCEDURE — 80329 ANALGESICS NON-OPIOID 1 OR 2: CPT

## 2017-06-12 PROCEDURE — 84443 ASSAY THYROID STIM HORMONE: CPT

## 2017-06-12 PROCEDURE — 80307 DRUG TEST PRSMV CHEM ANLYZR: CPT

## 2017-06-12 RX ORDER — LORAZEPAM 1 MG/1
2 TABLET ORAL
Status: COMPLETED | OUTPATIENT
Start: 2017-06-12 | End: 2017-06-12

## 2017-06-12 RX ADMIN — LORAZEPAM 2 MG: 1 TABLET ORAL at 01:06

## 2017-06-12 NOTE — ED NOTES
Pt in semifowlers position in stretcher, son at bedside. Speaking normally, in no distress at this time. ED tech Alcon at bedside for 1:1 observation. Will continue to monitor, NAD.

## 2017-06-12 NOTE — ED NOTES
Pt resting quietly in stretcher,  at bedside.  at bedside for 1:1 observation. Safety precautions maintained, will continue to monitor.

## 2017-06-12 NOTE — ED NOTES
Pt accepted to [facility] 5th floor, PEC packet faxed to [facility]

## 2017-06-12 NOTE — ED NOTES
Pt officially PEC'd by Dr. Noble due to gravely disabled. Psych protocol initiated. Tech Alcon at bedside. Paper scrubs placed on pt, belongings removed from room.

## 2017-06-12 NOTE — ED NOTES
Pt's  back to bedside. Pt no longer crying. Now calm. Blankets provided for pt comfort. Will continue to monitor. ED tech Alcon at bedside for 1:1 observation.

## 2017-06-12 NOTE — ED PROVIDER NOTES
"Encounter Date: 2017    SCRIBE #1 NOTE: I, Florentino Ramirez, am scribing for, and in the presence of,  Dr. Noble. I have scribed the entire note.       History     Chief Complaint   Patient presents with    Depression     Patient stated that she has been having anxiety related to work for awhile but that it has gotten worse over the past week.  Patient stated, "I just feel so depressed and lonely and I feel comfortable in my tub so I want to fill it up with water and just sink in it."  "All they wanna do is give me medication but nobody wants to help me.      Review of patient's allergies indicates:  No Known Allergies  Time seen by provider: 12:51 PM    This is a 56 y.o. Female with HLD and DM who presents with complaint of depression and anxiety starting approximately 5 days ago. She has been upset since a conflict at work. She has not been eating and has been crying since then, waking up often in the middle of the night. She denies SI, HI, hallucinations and HA. She has previously been seen by her PCP for depression. She was recently diagnosed with DM and started on medication.      The history is provided by the patient and the spouse.     Past Medical History:   Diagnosis Date    Anxiety 2016    seeing therapist    Elevated glucose 2017    Hyperlipidemia     Hypothyroidism     after tx with iodine    Prediabetes 2017    Uncontrolled type 2 diabetes mellitus without complication, without long-term current use of insulin 2017     Past Surgical History:   Procedure Laterality Date     SECTION, CLASSIC       Family History   Problem Relation Age of Onset    Hypertension Father     Stroke Father     Thyroid disease Sister      Social History   Substance Use Topics    Smoking status: Never Smoker    Smokeless tobacco: Never Used    Alcohol use Yes      Comment: weekly socially     Review of Systems   Constitutional: Positive for appetite change. Negative for chills, " diaphoresis and fever.   HENT: Negative for congestion and sore throat.    Eyes: Negative for pain.   Respiratory: Negative for cough and shortness of breath.    Cardiovascular: Negative for chest pain.   Gastrointestinal: Negative for abdominal pain, diarrhea, nausea and vomiting.   Genitourinary: Negative for difficulty urinating and dysuria.   Musculoskeletal: Negative for back pain and myalgias.   Skin: Negative for color change and wound.   Neurological: Negative for light-headedness and headaches.   Psychiatric/Behavioral: Positive for sleep disturbance. Negative for confusion, hallucinations, self-injury and suicidal ideas. The patient is nervous/anxious.         Depression       Physical Exam     Initial Vitals [06/12/17 1232]   BP Pulse Resp Temp SpO2   93/69 72 14 98.2 °F (36.8 °C) 99 %     Physical Exam    Nursing note and vitals reviewed.  Constitutional: She appears well-developed and well-nourished. She is not diaphoretic. No distress.   HENT:   Head: Normocephalic and atraumatic.   Mouth/Throat: Oropharynx is clear and moist.   Eyes: Conjunctivae are normal. Pupils are equal, round, and reactive to light. Right eye exhibits no discharge. Left eye exhibits no discharge.   Neck: Normal range of motion. Neck supple.   Cardiovascular: Normal rate, regular rhythm, normal heart sounds and intact distal pulses. Exam reveals no gallop and no friction rub.    No murmur heard.  Pulmonary/Chest: Breath sounds normal. No respiratory distress. She has no wheezes. She has no rhonchi. She has no rales.   Abdominal: Soft. Bowel sounds are normal. She exhibits no distension. There is no tenderness. There is no rebound and no guarding.   Musculoskeletal: Normal range of motion. She exhibits no edema or tenderness.   Neurological: She is alert and oriented to person, place, and time. She has normal strength. No sensory deficit.   Skin: Skin is warm and dry. No rash and no abscess noted. No erythema. No pallor.    Psychiatric: Her speech is normal. Judgment and thought content normal. She is slowed. She is not actively hallucinating. She exhibits a depressed mood.   Patient does not admit to specific plan, but does not deny suicidal ideations.  Tearful during examination.  She has intermittent bursts of uncontrollable crying. She is attentive.         ED Course   Procedures  Labs Reviewed   CBC W/ AUTO DIFFERENTIAL - Abnormal; Notable for the following:        Result Value    RBC 5.67 (*)     MCV 68 (*)     MCH 21.7 (*)     RDW 15.5 (*)     All other components within normal limits   URINALYSIS - Abnormal; Notable for the following:     Specific Gravity, UA >=1.030 (*)     Ketones, UA Trace (*)     Bilirubin (UA) 1+ (*)     Occult Blood UA Trace (*)     All other components within normal limits   ACETAMINOPHEN LEVEL - Abnormal; Notable for the following:     Acetaminophen (Tylenol), Serum <3.0 (*)     All other components within normal limits   SALICYLATE LEVEL - Abnormal; Notable for the following:     Salicylate Lvl <5.0 (*)     All other components within normal limits   DRUG SCREEN PANEL, URINE EMERGENCY - Abnormal; Notable for the following:     Creatinine, Random Ur 400.4 (*)     All other components within normal limits   BASIC METABOLIC PANEL   ALCOHOL,MEDICAL (ETHANOL)   TSH             Medical Decision Making:   Clinical Tests:   Lab Tests: Ordered and Reviewed  ED Management:  A 56-year-old female presents with symptoms of severe depression.  She does not have a clear suicidal plan but cannot answer me directly that she does not have any suicidal ideations.  She is gravely disabled as she's not eating not working and closing herself in.  Based on this I do feel the patient needs to be placed on a PEC.  Do not feel she is safe for outpatient therapy at this time.  We'll get the labs that are required by the psychiatric facilities.    2:30 PM labs reviewed.  The patient is medically cleared for psychiatric  evaluation.            Scribe Attestation:   Scribe #1: I performed the above scribed service and the documentation accurately describes the services I performed. I attest to the accuracy of the note.    Attending Attestation:           Physician Attestation for Scribe:  Physician Attestation Statement for Scribe #1: I, Dr. Noble, reviewed documentation, as scribed by Florentino Ramirez in my presence, and it is both accurate and complete.                 ED Course     Clinical Impression:     1. Severe depression             Xavier Noble, DO  06/12/17 1434       Xavier Noble, DO  06/12/17 1517

## 2017-06-12 NOTE — ED NOTES
Pt asleep in stretcher, respirations even and unlabored. ED tech Alcon remains at bedside for 1:1 observation. Safety precautions maintained, will continue to monitor.

## 2017-06-12 NOTE — ED NOTES
Pt given meal tray. ED tech Alcon remains at bedside for 1:1 observation. Pt currently calm, cooperative, in NAD.  no longer present. Will continue to monitor, awaiting arrival of Dr. Hoskins for psych assessment.

## 2017-06-12 NOTE — ED NOTES
All belongings given to pt's ,  removing belongings from room and taking them to car. Security remains present at bedside.

## 2017-06-12 NOTE — ED NOTES
Pt reports decreased appetite, weight loss, and inability to work due to current psychological state.

## 2017-06-12 NOTE — ED TRIAGE NOTES
"C/o anxiety x > 1 week. States has been seen by PCP, but "they didn't do anything for me." Reports onset of anxiety since "episode at work where I feared for my life". Denies SI, HI at this time. Pt very tearful, states "I just want relief from the anxiety."  "

## 2017-06-13 NOTE — PSYCH
IDENTIFICATION DATA:  This is a 56-year-old  -American female who   was brought to ER due to increased depression, anxiety and passive suicidal   thoughts.  This consult is requested by Dr. Noble for psych evaluation.    The patient is on a PEC status.    HISTORY OF PRESENT ILLNESS:  According to PEC and intake note, the patient has   anxiety and depression.  She tried to sink herself in the tub.  The patient was   very vague with the ER physician about her suicidal thoughts.  The patient told   Dr. Tena that she suffers from depression for a while in the last few days had   argument with coworker that had gotten bad.  She feels depressed, sad,   helpless, hopeless, frustrated and had depressive suicidal thoughts.  She   admitted that she filled up the tub because she likes it and then she got   thoughts of sinking herself.  The patient states that she had an argument with   the coworker where she works as a .  She states that she is working   in that kind of thing for 16 years, but she was not able to compose herself like   she was before.    The patient stated that she started crying and   was feeling shaky.  The patient states that her attention and concentration is   not the same.  Her energy is variable.  She had lost some interest in things.    She admitted last night she had unusual thoughts of drowning.  The patient   states that she is anxious and nervous.  She threw up before going to work   because of her nerves.  She states that she called her primary care physician   and looks like he was helpless to help me.  The patient talked to a lady, who is   her counselor who told her to see a psychiatrist.  The patient has conflict at   work.  She drinks some whiskey now and then.  She considers herself as a social   drinker.  She does not get withdrawals and did not get up to the level of   intoxication.  The patient states that she smokes weed now and then and the last   time  was last night.  The patient has noticed that she hears voices probably in   her head and at times she answers to her thoughts.  She has variable sleep   pattern and energy.  She did not sleep good last night and had crying spells.    She woke up in the middle of the night also.    PAST PSYCHIATRIC HISTORY:  The patient states that she has never been in   psychiatric hospital before.  She was seen by a psychiatrist nine months ago who   prescribed too many pills and she did not take those pills.  She had been in   counseling before.  She has no history of suicide or homicide.  She denies   trouble with the law.    SOCIAL AND FAMILY HISTORY:  The patient was born and raised in [place].  She   described her childhood as good.  The patient's dad  when she was [age] and   she misses her dad.  Her mom did a good job.  She is  and has one grown   up son.  She has a sister with depression.  She is on antidepressant, which   helps her.    PAST MEDICAL HISTORY:  The patient has hyperlipidemia, diabetes mellitus,   hypothyroidism.    Her CBC and chemistry are normal.  Her urine drug screen was positive for   marijuana.  Her TSH is low.   Her blood pressure was 93/69 upon arrival and now   it is 141/81.    ALLERGIES:  She is not allergic to any medicine or food.    Her alcohol level is less than 10.    MENTAL STATUS EXAMINATION:  This is a 56-year-old, slightly overweight, white   female who clogged her ears with napkins because noises are bothering her.  Her   mood is severely depressed with sad affect.  Psychomotor activity is decreased.    Her speech is soft, clear, and normal in amount, rate and tone.  At times, she   is tangential.  She is attentive.  She denies hearing voices, but she feels like   she hears voices in her head and at times, she feels like answering to her   voices.  Insight and judgment are impaired.  She admitted that she had heard voices.   Insight and judgment are fair.  She is of average  intelligence.    PSYCHIATRIC DIAGNOSES:  AXIS I:  Major depressive disorder, recurrent with mood congruent psychotic   features; alcohol use disorder, mild; cannabis use disorder, mild.  AXIS II:  No diagnosis.  AXIS III:  Diabetes mellitus, hyperlipidemia, hypothyroidism.  AXIS IV:  Medical problems, occupational problem, poor coping skill.  AXIS V:  35.    RECOMMENDATIONS:  1.  [place] [facility].  2.  We will start this patient on antidepressant after listening to the sister   who is doing well on antidepressant.  We will gather more information from    related to current stressors including work.  We will start this patient   on Prozac if the patient not able to get medication list.  We will provide safe,   supportive and secure environment and help the client to learn coping skills to   deal with her current stressors at work.    PROGNOSIS:  Fair.    ESTIMATED LENGTH OF STAY IN HOSPITAL:  Would be 3 days.    CRITERIA FOR DISCHARGE:  The patient will show improvement in depression, vague   suicidal thoughts and able to cope with stress.    ASSETS:  The patient is verbal, employed and has a place to live.  The patient   has supportive family.    PROBLEM LIST:  Depression, suicidal thoughts, unable to cope with stress.        /jennifer [id number] ra(s)        SHERLY/  dd: 06/12/2017 17:00:30 (CDT)  td: 06/13/2017 01:43:40 (CDT)  Doc ID   #[id number]  Job ID #[id number]    [place] [facility]   Xavier Noble D.O.

## 2017-06-13 NOTE — ED NOTES
Pt care assumed.  Pt rounding complete.  Pain 0/10, pt appears tearful, but in NAD.  Restroom and comfort needs addressed.  Pt updated on plan of care.  ED sitter at bedside.  Will continue to monitor.

## 2017-07-25 RX ORDER — METFORMIN HYDROCHLORIDE 500 MG/1
500 TABLET, EXTENDED RELEASE ORAL
Qty: 90 TABLET | Refills: 3 | Status: SHIPPED | OUTPATIENT
Start: 2017-07-25 | End: 2018-08-30 | Stop reason: SDUPTHER

## 2017-09-08 RX ORDER — LOSARTAN POTASSIUM 25 MG/1
25 TABLET ORAL DAILY
Qty: 90 TABLET | Refills: 3 | Status: SHIPPED | OUTPATIENT
Start: 2017-09-08 | End: 2018-09-26 | Stop reason: SDUPTHER

## 2017-09-11 ENCOUNTER — LAB VISIT (OUTPATIENT)
Dept: LAB | Facility: HOSPITAL | Age: 57
End: 2017-09-11
Payer: COMMERCIAL

## 2017-09-11 LAB
ALBUMIN SERPL BCP-MCNC: 4 G/DL
ALP SERPL-CCNC: 79 U/L
ALT SERPL W/O P-5'-P-CCNC: 17 U/L
ANION GAP SERPL CALC-SCNC: 8 MMOL/L
AST SERPL-CCNC: 31 U/L
BILIRUB SERPL-MCNC: 0.7 MG/DL
BUN SERPL-MCNC: 9 MG/DL
CALCIUM SERPL-MCNC: 9.8 MG/DL
CHLORIDE SERPL-SCNC: 104 MMOL/L
CHOLEST SERPL-MCNC: 204 MG/DL
CHOLEST/HDLC SERPL: 3.6 {RATIO}
CO2 SERPL-SCNC: 29 MMOL/L
CREAT SERPL-MCNC: 0.9 MG/DL
EST. GFR  (AFRICAN AMERICAN): >60 ML/MIN/1.73 M^2
EST. GFR  (NON AFRICAN AMERICAN): >60 ML/MIN/1.73 M^2
ESTIMATED AVG GLUCOSE: 105 MG/DL
GLUCOSE SERPL-MCNC: 96 MG/DL
HBA1C MFR BLD HPLC: 5.3 %
HDLC SERPL-MCNC: 56 MG/DL
HDLC SERPL: 27.5 %
LDLC SERPL CALC-MCNC: 125.4 MG/DL
NONHDLC SERPL-MCNC: 148 MG/DL
POTASSIUM SERPL-SCNC: 4.2 MMOL/L
PROT SERPL-MCNC: 8 G/DL
SODIUM SERPL-SCNC: 141 MMOL/L
TRIGL SERPL-MCNC: 113 MG/DL

## 2017-09-11 PROCEDURE — 36415 COLL VENOUS BLD VENIPUNCTURE: CPT

## 2017-09-11 PROCEDURE — 80053 COMPREHEN METABOLIC PANEL: CPT

## 2017-09-11 PROCEDURE — 83036 HEMOGLOBIN GLYCOSYLATED A1C: CPT

## 2017-09-11 PROCEDURE — 80061 LIPID PANEL: CPT

## 2017-09-15 ENCOUNTER — OFFICE VISIT (OUTPATIENT)
Dept: FAMILY MEDICINE | Facility: CLINIC | Age: 57
End: 2017-09-15
Payer: COMMERCIAL

## 2017-09-15 VITALS
DIASTOLIC BLOOD PRESSURE: 80 MMHG | HEIGHT: 66 IN | SYSTOLIC BLOOD PRESSURE: 108 MMHG | TEMPERATURE: 98 F | WEIGHT: 147.69 LBS | BODY MASS INDEX: 23.74 KG/M2 | HEART RATE: 68 BPM

## 2017-09-15 DIAGNOSIS — E11.9 CONTROLLED TYPE 2 DIABETES MELLITUS WITHOUT COMPLICATION, WITHOUT LONG-TERM CURRENT USE OF INSULIN: Primary | ICD-10-CM

## 2017-09-15 DIAGNOSIS — R63.4 WEIGHT LOSS: ICD-10-CM

## 2017-09-15 PROCEDURE — 3008F BODY MASS INDEX DOCD: CPT | Mod: S$GLB,,, | Performed by: NURSE PRACTITIONER

## 2017-09-15 PROCEDURE — 99999 PR PBB SHADOW E&M-EST. PATIENT-LVL IV: CPT | Mod: PBBFAC,,, | Performed by: NURSE PRACTITIONER

## 2017-09-15 PROCEDURE — 3044F HG A1C LEVEL LT 7.0%: CPT | Mod: S$GLB,,, | Performed by: NURSE PRACTITIONER

## 2017-09-15 PROCEDURE — 4010F ACE/ARB THERAPY RXD/TAKEN: CPT | Mod: S$GLB,,, | Performed by: NURSE PRACTITIONER

## 2017-09-15 PROCEDURE — 99214 OFFICE O/P EST MOD 30 MIN: CPT | Mod: S$GLB,,, | Performed by: NURSE PRACTITIONER

## 2017-09-15 RX ORDER — TRAZODONE HYDROCHLORIDE 150 MG/1
150 TABLET ORAL NIGHTLY
Refills: 1 | COMMUNITY
Start: 2017-07-13

## 2017-09-15 RX ORDER — QUETIAPINE FUMARATE 100 MG/1
100 TABLET, FILM COATED ORAL NIGHTLY
Refills: 1 | COMMUNITY
Start: 2017-07-13

## 2017-09-15 RX ORDER — LORAZEPAM 0.5 MG/1
0.5 TABLET ORAL 2 TIMES DAILY
Refills: 1 | COMMUNITY
Start: 2017-07-13

## 2017-09-15 RX ORDER — SERTRALINE HYDROCHLORIDE 25 MG/1
25 TABLET, FILM COATED ORAL DAILY
Refills: 0 | COMMUNITY
Start: 2017-06-16

## 2017-09-15 NOTE — PATIENT INSTRUCTIONS
Take a low dose aspirin 81 mg daily    Get an eye exam every year, see Tonya prince    Get a dental exam at least twice a year

## 2017-09-15 NOTE — PROGRESS NOTES
"Subjective:       Patient ID: Shara Combs is a 57 y.o. female.    Chief Complaint: Diabetes (DMII follow up x4 mo)    Diabetes   She presents for her follow-up diabetic visit. She has type 2 diabetes mellitus. Her disease course has been improving. There are no hypoglycemic associated symptoms. There are no diabetic associated symptoms. Pertinent negatives for diabetes include no chest pain, no polydipsia, no polyphagia and no polyuria. There are no hypoglycemic complications. There are no diabetic complications. Risk factors for coronary artery disease include diabetes mellitus, dyslipidemia and post-menopausal. Current diabetic treatment includes oral agent (monotherapy). She is compliant with treatment all of the time. She is following a generally healthy diet. Meal planning includes avoidance of concentrated sweets. She has not had a previous visit with a dietitian. She participates in exercise daily. An ACE inhibitor/angiotensin II receptor blocker is being taken. She does not see a podiatrist.Eye exam is not current.     Past Medical History:   Diagnosis Date    Anxiety 2016    seeing therapist    Elevated glucose 2017    Hyperlipidemia     Hypothyroidism     after tx with iodine    Prediabetes 2017    Uncontrolled type 2 diabetes mellitus without complication, without long-term current use of insulin 2017     Past Surgical History:   Procedure Laterality Date     SECTION, CLASSIC       Social History     Social History Narrative    [employer] , , 18yo son, returned to her home in [facility], nonsmoker, consumes ETOH 1-2x/month      GYN here. Never had colonoscopy                  Family History   Problem Relation Age of Onset    Hypertension Father     Stroke Father     Thyroid disease Sister      Vitals:    09/15/17 1256   BP: 108/80   Pulse: 68   Temp: 98.1 °F (36.7 °C)   Weight: 67 kg (147 lb 11.3 oz)   Height: 5' 6" (1.676 m) " "  PainSc: 0-No pain     Outpatient Encounter Prescriptions as of 9/15/2017   Medication Sig Dispense Refill    levothyroxine (SYNTHROID) 112 MCG tablet Take 1 tablet (112 mcg total) by mouth every morning. 90 tablet 3    lorazepam (ATIVAN) 0.5 MG tablet Take 0.5 mg by mouth 2 (two) times daily.  1    losartan (COZAAR) 25 MG tablet Take 1 tablet (25 mg total) by mouth once daily. 90 tablet 3    metformin (GLUCOPHAGE-XR) 500 MG 24 hr tablet Take 1 tablet (500 mg total) by mouth daily with breakfast. 90 tablet 3    pravastatin (PRAVACHOL) 40 MG tablet Take 1 tablet (40 mg total) by mouth once daily. 90 tablet 3    quetiapine (SEROQUEL) 100 MG Tab Take 100 mg by mouth nightly. at bedtime.  1    sertraline (ZOLOFT) 25 MG tablet Take 25 mg by mouth once daily.  0    trazodone (DESYREL) 150 MG tablet Take 150 mg by mouth nightly. at bedtime.  1     No facility-administered encounter medications on file as of 9/15/2017.      Wt Readings from Last 3 Encounters:   09/15/17 67 kg (147 lb 11.3 oz)   06/12/17 76.2 kg (168 lb)   05/09/17 75 kg (165 lb 5.5 oz)     Last 3 sets of Vitals    Vitals - 1 value per visit 5/9/2017 6/12/2017 9/15/2017   SYSTOLIC 114 122 108   DIASTOLIC 84 74 80   PULSE 68 60 68   TEMPERATURE 98.4 98.9 98.1   RESPIRATIONS - 16 -   SPO2 - 98 -   Weight (lb) 165.35 168 147.71   Weight (kg) 75 76.204 67   HEIGHT 5' 6" 5' 7" 5' 6"   BODY MASS INDEX 26.69 26.31 23.84   VISIT REPORT - - -   Pain Score  0 - 0   Some recent data might be hidden     No results found for: CBC  Review of Systems   Constitutional: Negative for chills.   Eyes: Negative for photophobia and visual disturbance.   Respiratory: Negative for cough.    Cardiovascular: Negative for chest pain.   Endocrine: Negative for polydipsia, polyphagia and polyuria.   Skin: Negative for rash.   Hematological: Negative for adenopathy.   Psychiatric/Behavioral: Negative for sleep disturbance.       Objective:      Physical Exam   Constitutional: " She appears well-developed and well-nourished. No distress.   HENT:   Head: Normocephalic and atraumatic.   Eyes: Conjunctivae are normal. Pupils are equal, round, and reactive to light.   Neck: Normal range of motion. No JVD present.   Cardiovascular: Normal rate, regular rhythm, normal heart sounds and intact distal pulses.    Pulmonary/Chest: Effort normal and breath sounds normal. No stridor. No respiratory distress.   Abdominal: Soft.   Neurological: She is alert.   Skin: Skin is warm and dry. Capillary refill takes less than 2 seconds. She is not diaphoretic.   Psychiatric: She has a normal mood and affect. Her behavior is normal. Judgment and thought content normal.   Nursing note and vitals reviewed.         Hemoglobin A1C   Date Value Ref Range Status   09/11/2017 5.3 4.0 - 5.6 % Final     Comment:     According to ADA guidelines, hemoglobin A1c <7.0% represents  optimal control in non-pregnant diabetic patients. Different  metrics may apply to specific patient populations.   Standards of Medical Care in Diabetes-2016.  For the purpose of screening for the presence of diabetes:  <5.7%     Consistent with the absence of diabetes  5.7-6.4%  Consistent with increasing risk for diabetes   (prediabetes)  >or=6.5%  Consistent with diabetes  Currently, no consensus exists for use of hemoglobin A1c  for diagnosis of diabetes for children.  This Hemoglobin A1c assay has significant interference with fetal   hemoglobin   (HbF). The results are invalid for patients with abnormal amounts of   HbF,   including those with known Hereditary Persistence   of Fetal Hemoglobin. Heterozygous hemoglobin variants (HbAS, HbAC,   HbAD, HbAE, HbA2) do not significantly interfere with this assay;   however, presence of multiple variants in a sample may impact the %   interference.     05/04/2017 6.3 (H) 4.5 - 6.2 % Final     Comment:     According to ADA guidelines, hemoglobin A1C <7.0% represents  optimal control in non-pregnant  diabetic patients.  Different  metrics may apply to specific populations.   Standards of Medical Care in Diabetes - 2016.  For the purpose of screening for the presence of diabetes:  <5.7%     Consistent with the absence of diabetes  5.7-6.4%  Consistent with increasing risk for diabetes   (prediabetes)  >or=6.5%  Consistent with diabetes  Currently no consensus exists for use of hemoglobin A1C  for diagnosis of diabetes for children.     01/04/2017 6.2 4.5 - 6.2 % Final     Comment:     According to ADA guidelines, hemoglobin A1C <7.0% represents  optimal control in non-pregnant diabetic patients.  Different  metrics may apply to specific populations.   Standards of Medical Care in Diabetes - 2016.  For the purpose of screening for the presence of diabetes:  <5.7%     Consistent with the absence of diabetes  5.7-6.4%  Consistent with increasing risk for diabetes   (prediabetes)  >or=6.5%  Consistent with diabetes  Currently no consensus exists for use of hemoglobin A1C  for diagnosis of diabetes for children.           Chemistry        Component Value Date/Time     09/11/2017 0817    K 4.2 09/11/2017 0817     09/11/2017 0817    CO2 29 09/11/2017 0817    BUN 9 09/11/2017 0817    CREATININE 0.9 09/11/2017 0817    GLU 96 09/11/2017 0817        Component Value Date/Time    CALCIUM 9.8 09/11/2017 0817    ALKPHOS 79 09/11/2017 0817    AST 31 09/11/2017 0817    ALT 17 09/11/2017 0817    BILITOT 0.7 09/11/2017 0817    ESTGFRAFRICA >60.0 09/11/2017 0817    EGFRNONAA >60.0 09/11/2017 0817            Lab Results   Component Value Date    CHOL 204 (H) 09/11/2017    CHOL 208 (H) 12/28/2016    CHOL 215 (H) 01/31/2014       Lab Results   Component Value Date    HDL 56 09/11/2017    HDL 51 12/28/2016    HDL 55 01/31/2014       Lab Results   Component Value Date    LDLCALC 125.4 09/11/2017    LDLCALC 127.4 12/28/2016    LDLCALC 129.8 01/31/2014       Lab Results   Component Value Date    TRIG 113 09/11/2017    TRIG 148  12/28/2016    TRIG 151 (H) 01/31/2014       Lab Results   Component Value Date    CHOLHDL 27.5 09/11/2017    CHOLHDL 24.5 12/28/2016    CHOLHDL 25.6 01/31/2014           Lab Results   Component Value Date    MICALBCREAT 3.3 02/08/2017       Lab Results   Component Value Date    TSH 0.696 06/12/2017           Estimated Creatinine Clearance: 65.3 mL/min (based on SCr of 0.9 mg/dL).        No results found for: [id number]     Assessment:       1. Controlled type 2 diabetes mellitus without complication, without long-term current use of insulin    2. Weight loss        Plan:       ADA STANDARDS of CARE:  ACE inhibitor of angiotensin II receptor blocker: Cozaaar 25 mg  Statin drug: Pravastatin  Low dose ASA: discussed today pt to start siobhan  Eye exam within last year: consult placed   Dental exam: 1 year ago, discussed with pt  Flu shot: declined today  Microalbumin: 2/2017  Documentation of foot exam:5/2017    Shara was seen today for diabetes.    Diagnoses and all orders for this visit:    Controlled type 2 diabetes mellitus without complication, without long-term current use of insulin  -     Ambulatory referral to Optometry    Weight loss  Comments:  ? due to MEtformin or anxiety.  WIll monitor        Patient Instructions   Take a low dose aspirin 81 mg daily    Get an eye exam every year, see Tonya prince    Get a dental exam at least twice a year

## 2018-02-21 RX ORDER — PRAVASTATIN SODIUM 40 MG/1
40 TABLET ORAL DAILY
Qty: 90 TABLET | Refills: 0 | Status: SHIPPED | OUTPATIENT
Start: 2018-02-21 | End: 2018-06-23 | Stop reason: SDUPTHER

## 2018-02-21 RX ORDER — LEVOTHYROXINE SODIUM 112 UG/1
112 TABLET ORAL EVERY MORNING
Qty: 90 TABLET | Refills: 0 | Status: SHIPPED | OUTPATIENT
Start: 2018-02-21 | End: 2018-06-23 | Stop reason: SDUPTHER

## 2018-03-02 ENCOUNTER — TELEPHONE (OUTPATIENT)
Dept: FAMILY MEDICINE | Facility: CLINIC | Age: 58
End: 2018-03-02

## 2018-03-02 DIAGNOSIS — Z00.00 ROUTINE GENERAL MEDICAL EXAMINATION AT A HEALTH CARE FACILITY: Primary | ICD-10-CM

## 2018-03-02 NOTE — TELEPHONE ENCOUNTER
----- Message from Lubna Sol sent at 3/2/2018  8:29 AM CST -----  Lab Orders Needed    I have scheduled the above patients annual physical and lab appointments. Lab orders need to be placed and linked.    Date of Annual Physical: 03/08/2018    Date of Lab appt: 03/06/2018    Sorry so close but the patient did not want to wait till the next available which was in May.    Thank You

## 2018-03-06 ENCOUNTER — LAB VISIT (OUTPATIENT)
Dept: LAB | Facility: HOSPITAL | Age: 58
End: 2018-03-06
Attending: FAMILY MEDICINE
Payer: COMMERCIAL

## 2018-03-06 DIAGNOSIS — Z00.00 ROUTINE GENERAL MEDICAL EXAMINATION AT A HEALTH CARE FACILITY: ICD-10-CM

## 2018-03-06 LAB
ALBUMIN SERPL BCP-MCNC: 4.1 G/DL
ALP SERPL-CCNC: 68 U/L
ALT SERPL W/O P-5'-P-CCNC: 14 U/L
ANION GAP SERPL CALC-SCNC: 9 MMOL/L
AST SERPL-CCNC: 36 U/L
BASOPHILS # BLD AUTO: 0.04 K/UL
BASOPHILS NFR BLD: 0.6 %
BILIRUB SERPL-MCNC: 0.6 MG/DL
BUN SERPL-MCNC: 11 MG/DL
CALCIUM SERPL-MCNC: 9.7 MG/DL
CHLORIDE SERPL-SCNC: 105 MMOL/L
CHOLEST SERPL-MCNC: 205 MG/DL
CHOLEST/HDLC SERPL: 3.2 {RATIO}
CO2 SERPL-SCNC: 27 MMOL/L
CREAT SERPL-MCNC: 0.9 MG/DL
DIFFERENTIAL METHOD: ABNORMAL
EOSINOPHIL # BLD AUTO: 0.1 K/UL
EOSINOPHIL NFR BLD: 1.3 %
ERYTHROCYTE [DISTWIDTH] IN BLOOD BY AUTOMATED COUNT: 16.3 %
EST. GFR  (AFRICAN AMERICAN): >60 ML/MIN/1.73 M^2
EST. GFR  (NON AFRICAN AMERICAN): >60 ML/MIN/1.73 M^2
ESTIMATED AVG GLUCOSE: 105 MG/DL
GLUCOSE SERPL-MCNC: 85 MG/DL
HBA1C MFR BLD HPLC: 5.3 %
HCT VFR BLD AUTO: 38.1 %
HDLC SERPL-MCNC: 65 MG/DL
HDLC SERPL: 31.7 %
HGB BLD-MCNC: 11.7 G/DL
IMM GRANULOCYTES # BLD AUTO: 0.01 K/UL
IMM GRANULOCYTES NFR BLD AUTO: 0.2 %
LDLC SERPL CALC-MCNC: 124.4 MG/DL
LYMPHOCYTES # BLD AUTO: 2.2 K/UL
LYMPHOCYTES NFR BLD: 35.3 %
MCH RBC QN AUTO: 21.7 PG
MCHC RBC AUTO-ENTMCNC: 30.7 G/DL
MCV RBC AUTO: 71 FL
MONOCYTES # BLD AUTO: 0.4 K/UL
MONOCYTES NFR BLD: 6.2 %
NEUTROPHILS # BLD AUTO: 3.5 K/UL
NEUTROPHILS NFR BLD: 56.4 %
NONHDLC SERPL-MCNC: 140 MG/DL
NRBC BLD-RTO: 0 /100 WBC
PLATELET # BLD AUTO: 178 K/UL
PMV BLD AUTO: ABNORMAL FL
POTASSIUM SERPL-SCNC: 3.9 MMOL/L
PROT SERPL-MCNC: 7.7 G/DL
RBC # BLD AUTO: 5.4 M/UL
SODIUM SERPL-SCNC: 141 MMOL/L
TRIGL SERPL-MCNC: 78 MG/DL
TSH SERPL DL<=0.005 MIU/L-ACNC: 2.04 UIU/ML
WBC # BLD AUTO: 6.17 K/UL

## 2018-03-06 PROCEDURE — 83036 HEMOGLOBIN GLYCOSYLATED A1C: CPT

## 2018-03-06 PROCEDURE — 84443 ASSAY THYROID STIM HORMONE: CPT

## 2018-03-06 PROCEDURE — 80053 COMPREHEN METABOLIC PANEL: CPT

## 2018-03-06 PROCEDURE — 85025 COMPLETE CBC W/AUTO DIFF WBC: CPT

## 2018-03-06 PROCEDURE — 80061 LIPID PANEL: CPT

## 2018-03-06 PROCEDURE — 36415 COLL VENOUS BLD VENIPUNCTURE: CPT | Mod: PO

## 2018-03-08 ENCOUNTER — OFFICE VISIT (OUTPATIENT)
Dept: FAMILY MEDICINE | Facility: CLINIC | Age: 58
End: 2018-03-08
Payer: COMMERCIAL

## 2018-03-08 VITALS
DIASTOLIC BLOOD PRESSURE: 60 MMHG | TEMPERATURE: 99 F | BODY MASS INDEX: 23.6 KG/M2 | SYSTOLIC BLOOD PRESSURE: 100 MMHG | WEIGHT: 150.38 LBS | HEART RATE: 64 BPM | HEIGHT: 67 IN

## 2018-03-08 DIAGNOSIS — F41.9 ANXIETY: ICD-10-CM

## 2018-03-08 DIAGNOSIS — Z12.31 SCREENING MAMMOGRAM, ENCOUNTER FOR: ICD-10-CM

## 2018-03-08 DIAGNOSIS — E78.5 HYPERLIPIDEMIA ASSOCIATED WITH TYPE 2 DIABETES MELLITUS: ICD-10-CM

## 2018-03-08 DIAGNOSIS — E11.69 HYPERLIPIDEMIA ASSOCIATED WITH TYPE 2 DIABETES MELLITUS: ICD-10-CM

## 2018-03-08 DIAGNOSIS — Z12.11 SCREEN FOR COLON CANCER: ICD-10-CM

## 2018-03-08 DIAGNOSIS — E89.0 POSTABLATIVE HYPOTHYROIDISM: ICD-10-CM

## 2018-03-08 DIAGNOSIS — E11.9 CONTROLLED TYPE 2 DIABETES MELLITUS WITHOUT COMPLICATION, WITHOUT LONG-TERM CURRENT USE OF INSULIN: ICD-10-CM

## 2018-03-08 DIAGNOSIS — Z12.39 BREAST CANCER SCREENING: ICD-10-CM

## 2018-03-08 DIAGNOSIS — Z00.00 ROUTINE GENERAL MEDICAL EXAMINATION AT A HEALTH CARE FACILITY: Primary | ICD-10-CM

## 2018-03-08 LAB
CREAT UR-MCNC: 200 MG/DL
MICROALBUMIN UR DL<=1MG/L-MCNC: 9 UG/ML
MICROALBUMIN/CREATININE RATIO: 4.5 UG/MG

## 2018-03-08 PROCEDURE — 99999 PR PBB SHADOW E&M-EST. PATIENT-LVL III: CPT | Mod: PBBFAC,,, | Performed by: FAMILY MEDICINE

## 2018-03-08 PROCEDURE — 82043 UR ALBUMIN QUANTITATIVE: CPT

## 2018-03-08 PROCEDURE — 99396 PREV VISIT EST AGE 40-64: CPT | Mod: S$GLB,,, | Performed by: FAMILY MEDICINE

## 2018-03-08 RX ORDER — SERTRALINE HYDROCHLORIDE 100 MG/1
100 TABLET, FILM COATED ORAL DAILY
COMMUNITY
Start: 2017-12-14 | End: 2019-09-03 | Stop reason: DRUGHIGH

## 2018-03-08 RX ORDER — DOXEPIN HYDROCHLORIDE 100 MG/1
100 CAPSULE ORAL DAILY
COMMUNITY
Start: 2018-01-08 | End: 2020-07-15

## 2018-03-08 NOTE — PATIENT INSTRUCTIONS
Continue other medications    Follow with psychiatrist    FOR  DIABETES:  ===================================      SEE ME EVERY 6 MONTHS if your Hga1c is < 6.9% (controlled)   GET BLOODWORK ONE WEEK PRIOR  ===================================    Your 1# medicine is  EXERCISE  - huffing & puffing for 20  MINUTES EVERY DAY - check your sugars & you'll see them go down    The future risks of uncontrolled diabetes - include heart attack, stroke, neuropathy (pain in hands & feet that could lead to infection and amputation), nephropathy (leading to kidney dialysis) , and blindness     To prevent complications that can be treated early, please see your  opthalmologist EYE DOCTOR YEARLY      Always wear protective SHOES    Focus on NO SUGAR and no sugar substitutes (splenda,s tevia, etc) IN YOUR DRINKS. With respect to food - LOW CARBOHYDRATES - switch to whole wheat & brown rice.    Decrease & try to stop ALCOHOL, WHITE BREAD, WHITE PASTA, WHITE RICE , WHITE POTATOES- which all turn into sugar.    EAT an EXTRA VEGETABLE A DAY than you already do.    The ADA recommends taking  1. Aspirin 81 mg daily (unless you have had bleeding stomach ulcers or allergy to this)  2. STATIN medication (atorvastatin, pravastatin, rosuvastatin) to decrease inflammation in your blood vessels caused by SUGAR to prevent future heart attack & stroke. This is recommended even if your LDL cholesterol is <100.   3. ARB blood pressure medication (Losartan) to protect your kidneys from future dialysis from SUGAR. This is recommended even if you have normal blood pressure    Consider reading the book -  End Of Diabetes by Dr Miramontes    Once YEARLY I will check basic labs CBC, CMP, fasting lipids, TSH, Hga1C prior to your visit      ----------------------------        ==============================================================      I'd like to advise you on current CANCER SCREENING  recommendations:  ~~~~~~~~~~~~~~~~~~~~~~~~~~~~~~~~~~~~~~~~~~~~~~~~~~~~~~~~~~~~~   If all previous PAP SMEARS have been normal, no current problems, and no family history of female cancers, it is recommended to have Pap smear every 3 years (or after 29 yo, every 5 years with HPV co-testing).   MAMMOGRAM  every 1-2 years, from 50 - 74 years old. We can discuss your risk at 40 & determine whether to get mammogram sooner  Of course, I can perform this sooner if there is family history of cancer, or if you have problems or questions    RECOMMENDATIONS FOR FEMALES    Prevent the #1 cause of death- cardiovascular disease (HEART ATTACK & STROKE) by checking for normal blood pressure, cholesterol, sugars, & by not smoking.     VACCINES: Yearly FLU shot, ONE PNEUMONIA shot after 65, ONE SHINGLES shot after 60    Screening colonoscopy at AGE  50 & every 10 years to check for COLON CANCER,  one of the most common & preventable cancers. Or FIT z12.11, Repeat in 3 years if POLYP found     I recommend  high fiber (5 fresh fruits or vegetables daily), low fat diet and aerobic  exercise (huffing/ puffing/ sweating for 20 min straight at least 4 days a week)    Follow up yearly with mammogram (every 2 years) , fasting lipids, CMP, CBC, TSH prior.   ==============================================================

## 2018-03-08 NOTE — PROGRESS NOTES
Subjective:      Patient ID: Shara Combs is a 57 y.o. female.    Chief Complaint: Annual Exam    Here today for general exam & diabetes mellitus - 5.3% with metofrmin 500 qd. She has been walking 5 days a week & completely changed her diet - more greens, no fried foods, cooking at home.     Denies acute symptoms such as nocturia, polyuria, unexplained weight loss or blurred vision.    Last eye evaluation  - DUE    Last urine microalbumin - today    Denies numbness, tingling sensation, or known h/o peripheral neuropathy.     Denies any chest pain, shortness of breath, nausea vomiting constipation diarrhea, blood in stool, heartburn    She takes several medications for anxiety through psychiatrist Dr العلي & follows with therapist    Lab Results   Component Value Date    HGBA1C 5.3 03/06/2018    HGBA1C 5.3 09/11/2017    HGBA1C 6.3 (H) 05/04/2017     Lab Results   Component Value Date    MICALBCREAT 3.3 02/08/2017     Lab Results   Component Value Date    LDLCALC 124.4 03/06/2018    LDLCALC 125.4 09/11/2017    CHOL 205 (H) 03/06/2018    HDL 65 03/06/2018    TRIG 78 03/06/2018       Lab Results   Component Value Date     03/06/2018    K 3.9 03/06/2018     03/06/2018    CO2 27 03/06/2018    GLU 85 03/06/2018    BUN 11 03/06/2018    CREATININE 0.9 03/06/2018    CALCIUM 9.7 03/06/2018    PROT 7.7 03/06/2018    ALBUMIN 4.1 03/06/2018    BILITOT 0.6 03/06/2018    ALKPHOS 68 03/06/2018    AST 36 03/06/2018    ALT 14 03/06/2018    ANIONGAP 9 03/06/2018    ESTGFRAFRICA >60.0 03/06/2018    EGFRNONAA >60.0 03/06/2018    WBC 6.17 03/06/2018    HGB 11.7 (L) 03/06/2018    HCT 38.1 03/06/2018    MCV 71 (L) 03/06/2018     03/06/2018    TSH 2.045 03/06/2018         Current Outpatient Prescriptions on File Prior to Visit   Medication Sig    levothyroxine (SYNTHROID) 112 MCG tablet TAKE 1 TABLET (112 MCG TOTAL) BY MOUTH EVERY MORNING.    lorazepam (ATIVAN) 0.5 MG tablet Take 0.5 mg by mouth 2 (two)  "times daily.    losartan (COZAAR) 25 MG tablet Take 1 tablet (25 mg total) by mouth once daily.    metformin (GLUCOPHAGE-XR) 500 MG 24 hr tablet Take 1 tablet (500 mg total) by mouth daily with breakfast.    pravastatin (PRAVACHOL) 40 MG tablet TAKE 1 TABLET (40 MG TOTAL) BY MOUTH ONCE DAILY.    quetiapine (SEROQUEL) 100 MG Tab Take 100 mg by mouth nightly. at bedtime.    sertraline (ZOLOFT) 25 MG tablet Take 25 mg by mouth once daily.    trazodone (DESYREL) 150 MG tablet Take 150 mg by mouth nightly. at bedtime.     No current facility-administered medications on file prior to visit.      Past Medical History:   Diagnosis Date    Anxiety 2016    Dr Hartley, seeing therapist    Controlled type 2 diabetes mellitus without complication, without long-term current use of insulin 2017    Hyperlipidemia associated with type 2 diabetes mellitus 2012    Hypothyroidism     after tx with iodine     Past Surgical History:   Procedure Laterality Date     SECTION, CLASSIC       Social History     Social History Narrative    [employer] , , 18yo son, returned to her home in [facility], nonsmoker, consumes ETOH 1-2x/month      GYN here. Never had colonoscopy                  Family History   Problem Relation Age of Onset    Hypertension Father     Stroke Father     Thyroid disease Sister      Vitals:    18 0949   BP: 100/60   Pulse: 64   Temp: 98.5 °F (36.9 °C)   Weight: 68.2 kg (150 lb 5.7 oz)   Height: 5' 6.5" (1.689 m)     Objective:   Physical Exam   Constitutional: She is oriented to person, place, and time. She appears well-developed and well-nourished.   HENT:   Head: Normocephalic and atraumatic.   Right Ear: External ear normal.   Left Ear: External ear normal.   Nose: Nose normal.   Mouth/Throat: Oropharynx is clear and moist.   Eyes: EOM are normal. Pupils are equal, round, and reactive to light.   Neck: Neck supple. No thyromegaly present. "   Cardiovascular: Normal rate, regular rhythm, normal heart sounds and intact distal pulses.    No murmur heard.  Breasts symmetrical, nontender, no mass, no supraclavicular or axillary lymphadenopathy     Pulmonary/Chest: Effort normal and breath sounds normal. She has no wheezes.   Abdominal: Soft. Bowel sounds are normal. She exhibits no distension and no mass. There is no tenderness. There is no rebound and no guarding.   Musculoskeletal: She exhibits no edema.        Right foot: There is normal range of motion and no deformity.        Left foot: There is normal range of motion and no deformity.        Feet:   Right Foot:   Protective Sensation: 5 sites tested. 5 sites sensed.   Skin Integrity: Negative for ulcer, blister, skin breakdown, erythema or warmth.   Left Foot:   Protective Sensation: 5 sites tested. 5 sites sensed.   Skin Integrity: Negative for ulcer, blister, skin breakdown, erythema or warmth.   Lymphadenopathy:     She has no cervical adenopathy.   Neurological: She is alert and oriented to person, place, and time.   Skin: Skin is warm and dry.   Psychiatric: She has a normal mood and affect. Her behavior is normal.     Assessment:     1. Routine general medical examination at a health care facility    2. Breast cancer screening    3. Screen for colon cancer    4. Controlled type 2 diabetes mellitus without complication, without long-term current use of insulin    5. Hyperlipidemia associated with type 2 diabetes mellitus    6. Postablative hypothyroidism    7. Anxiety    8. Screening mammogram, encounter for      Plan:     Orders Placed This Encounter    Mammo Digital Screening Bilat with CAD    Fecal Immunochemical Test (iFOBT)    Hemoglobin A1c       Patient Instructions   Continue other medications    Follow with psychiatrist    FOR  DIABETES:  ===================================      SEE ME EVERY 6 MONTHS if your Hga1c is < 6.9% (controlled)   GET BLOODWORK ONE WEEK  PRIOR  ===================================    Your 1# medicine is  EXERCISE  - huffing & puffing for 20  MINUTES EVERY DAY - check your sugars & you'll see them go down    The future risks of uncontrolled diabetes - include heart attack, stroke, neuropathy (pain in hands & feet that could lead to infection and amputation), nephropathy (leading to kidney dialysis) , and blindness     To prevent complications that can be treated early, please see your  opthalmologist EYE DOCTOR YEARLY      Always wear protective SHOES    Focus on NO SUGAR and no sugar substitutes (splenda,s tevia, etc) IN YOUR DRINKS. With respect to food - LOW CARBOHYDRATES - switch to whole wheat & brown rice.    Decrease & try to stop ALCOHOL, WHITE BREAD, WHITE PASTA, WHITE RICE , WHITE POTATOES- which all turn into sugar.    EAT an EXTRA VEGETABLE A DAY than you already do.    The ADA recommends taking  1. Aspirin 81 mg daily (unless you have had bleeding stomach ulcers or allergy to this)  2. STATIN medication (atorvastatin, pravastatin, rosuvastatin) to decrease inflammation in your blood vessels caused by SUGAR to prevent future heart attack & stroke. This is recommended even if your LDL cholesterol is <100.   3. ARB blood pressure medication (Losartan) to protect your kidneys from future dialysis from SUGAR. This is recommended even if you have normal blood pressure    Consider reading the book -  End Of Diabetes by Dr Miramontes    Once YEARLY I will check basic labs CBC, CMP, fasting lipids, TSH, Hga1C prior to your visit      ----------------------------        ==============================================================      I'd like to advise you on current CANCER SCREENING recommendations:  ~~~~~~~~~~~~~~~~~~~~~~~~~~~~~~~~~~~~~~~~~~~~~~~~~~~~~~~~~~~~~   If all previous PAP SMEARS have been normal, no current problems, and no family history of female cancers, it is recommended to have Pap smear every 3 years (or after 29 yo, every 5 years  with HPV co-testing).   MAMMOGRAM  every 1-2 years, from 50 - 74 years old. We can discuss your risk at 40 & determine whether to get mammogram sooner  Of course, I can perform this sooner if there is family history of cancer, or if you have problems or questions    RECOMMENDATIONS FOR FEMALES    Prevent the #1 cause of death- cardiovascular disease (HEART ATTACK & STROKE) by checking for normal blood pressure, cholesterol, sugars, & by not smoking.     VACCINES: Yearly FLU shot, ONE PNEUMONIA shot after 65, ONE SHINGLES shot after 60    Screening colonoscopy at AGE  50 & every 10 years to check for COLON CANCER,  one of the most common & preventable cancers. Or FIT z12.11, Repeat in 3 years if POLYP found     I recommend  high fiber (5 fresh fruits or vegetables daily), low fat diet and aerobic  exercise (huffing/ puffing/ sweating for 20 min straight at least 4 days a week)    Follow up yearly with mammogram (every 2 years) , fasting lipids, CMP, CBC, TSH prior.   ==============================================================

## 2018-06-25 RX ORDER — PRAVASTATIN SODIUM 40 MG/1
40 TABLET ORAL DAILY
Qty: 90 TABLET | Refills: 2 | Status: SHIPPED | OUTPATIENT
Start: 2018-06-25 | End: 2019-09-03

## 2018-06-25 RX ORDER — LEVOTHYROXINE SODIUM 112 UG/1
112 TABLET ORAL EVERY MORNING
Qty: 90 TABLET | Refills: 2 | Status: SHIPPED | OUTPATIENT
Start: 2018-06-25 | End: 2019-07-30 | Stop reason: SDUPTHER

## 2018-08-30 RX ORDER — METFORMIN HYDROCHLORIDE 500 MG/1
500 TABLET, EXTENDED RELEASE ORAL
Qty: 90 TABLET | Refills: 0 | Status: SHIPPED | OUTPATIENT
Start: 2018-08-30 | End: 2019-09-03

## 2018-08-30 NOTE — TELEPHONE ENCOUNTER
Med refilled, last Hga1c was great 5.3%. Just have her get a nonfasting Hga1c in Sept. I'll email with results

## 2018-09-26 RX ORDER — LOSARTAN POTASSIUM 25 MG/1
25 TABLET ORAL DAILY
Qty: 90 TABLET | Refills: 1 | Status: SHIPPED | OUTPATIENT
Start: 2018-09-26 | End: 2019-09-03 | Stop reason: SDUPTHER

## 2019-04-08 ENCOUNTER — PATIENT MESSAGE (OUTPATIENT)
Dept: ADMINISTRATIVE | Facility: HOSPITAL | Age: 59
End: 2019-04-08

## 2019-04-12 ENCOUNTER — PATIENT MESSAGE (OUTPATIENT)
Dept: ADMINISTRATIVE | Facility: HOSPITAL | Age: 59
End: 2019-04-12

## 2019-05-10 DIAGNOSIS — Z12.11 COLON CANCER SCREENING: ICD-10-CM

## 2019-05-15 DIAGNOSIS — Z12.39 BREAST CANCER SCREENING: ICD-10-CM

## 2019-06-24 ENCOUNTER — TELEPHONE (OUTPATIENT)
Dept: PRIMARY CARE CLINIC | Facility: CLINIC | Age: 59
End: 2019-06-24

## 2019-06-24 NOTE — TELEPHONE ENCOUNTER
----- Message from Kieran Jay sent at 6/24/2019 11:43 AM CDT -----  Contact: Patient [phone]  RX request - refill or new RX.  Is this a refill or new RX:  Refill  RX name and strength: Cholesterol     Pharmacy name and phone #[id number] - [place], [place] [address] [facility] [phone] (Phone) [phone] (Fax)    Comments:  Patient stating lost the above Rx, would like a call to inform has been sent.    Please call an advise  Thank you

## 2019-06-24 NOTE — TELEPHONE ENCOUNTER
Phone msg left for pt Pravastain Rx .  Pt past due for annual exam and fasting labs.  LOV 3/8/18.  Call [phone] to schedule both appts. Refill will then be authorized through appt date.

## 2019-07-30 RX ORDER — LEVOTHYROXINE SODIUM 112 UG/1
112 TABLET ORAL EVERY MORNING
Qty: 90 TABLET | Refills: 0 | Status: SHIPPED | OUTPATIENT
Start: 2019-07-30 | End: 2019-09-03 | Stop reason: SDUPTHER

## 2019-08-01 ENCOUNTER — TELEPHONE (OUTPATIENT)
Dept: PRIMARY CARE CLINIC | Facility: CLINIC | Age: 59
End: 2019-08-01

## 2019-08-01 NOTE — TELEPHONE ENCOUNTER
----- Message from Kyra Aguiar sent at 8/1/2019  4:39 PM CDT -----  Contact: Self [phone]  Patient will like to speak to the nurse in regards to labs that she will like to have schedule for her appointment on 9/3/19

## 2019-08-02 DIAGNOSIS — Z00.00 ROUTINE MEDICAL EXAM: Primary | ICD-10-CM

## 2019-08-19 ENCOUNTER — PATIENT OUTREACH (OUTPATIENT)
Dept: ADMINISTRATIVE | Facility: HOSPITAL | Age: 59
End: 2019-08-19

## 2019-08-19 NOTE — PROGRESS NOTES
Pre-visit chart review completed. Pt eligible/ due for   Eye Exam     Pneumococcal Vaccine (Medium Risk) (1 of 1 - PPSV23)    Colonoscopy     Mammogram     Hemoglobin A1c     Lipid Panel     Foot Exam     Low Dose Statin      Message left for patient to return my call.  MILKA message sent to patient informing need for c-scope and eye exam.

## 2019-08-26 ENCOUNTER — LAB VISIT (OUTPATIENT)
Dept: LAB | Facility: HOSPITAL | Age: 59
End: 2019-08-26
Attending: FAMILY MEDICINE
Payer: COMMERCIAL

## 2019-08-26 DIAGNOSIS — Z00.00 ROUTINE MEDICAL EXAM: ICD-10-CM

## 2019-08-26 LAB
ALBUMIN SERPL BCP-MCNC: 4.2 G/DL (ref 3.5–5.2)
ALP SERPL-CCNC: 69 U/L (ref 55–135)
ALT SERPL W/O P-5'-P-CCNC: 15 U/L (ref 10–44)
ANION GAP SERPL CALC-SCNC: 8 MMOL/L (ref 8–16)
AST SERPL-CCNC: 34 U/L (ref 10–40)
BASOPHILS # BLD AUTO: 0.03 K/UL (ref 0–0.2)
BASOPHILS NFR BLD: 0.5 % (ref 0–1.9)
BILIRUB SERPL-MCNC: 0.5 MG/DL (ref 0.1–1)
BUN SERPL-MCNC: 13 MG/DL (ref 6–20)
CALCIUM SERPL-MCNC: 9.6 MG/DL (ref 8.7–10.5)
CHLORIDE SERPL-SCNC: 104 MMOL/L (ref 95–110)
CHOLEST SERPL-MCNC: 248 MG/DL (ref 120–199)
CHOLEST/HDLC SERPL: 3.9 {RATIO} (ref 2–5)
CO2 SERPL-SCNC: 29 MMOL/L (ref 23–29)
CREAT SERPL-MCNC: 1 MG/DL (ref 0.5–1.4)
DIFFERENTIAL METHOD: ABNORMAL
EOSINOPHIL # BLD AUTO: 0.1 K/UL (ref 0–0.5)
EOSINOPHIL NFR BLD: 1.4 % (ref 0–8)
ERYTHROCYTE [DISTWIDTH] IN BLOOD BY AUTOMATED COUNT: 16.9 % (ref 11.5–14.5)
EST. GFR  (AFRICAN AMERICAN): >60 ML/MIN/1.73 M^2
EST. GFR  (NON AFRICAN AMERICAN): >60 ML/MIN/1.73 M^2
ESTIMATED AVG GLUCOSE: 117 MG/DL (ref 68–131)
GLUCOSE SERPL-MCNC: 96 MG/DL (ref 70–110)
HBA1C MFR BLD HPLC: 5.7 % (ref 4–5.6)
HCT VFR BLD AUTO: 39.8 % (ref 37–48.5)
HDLC SERPL-MCNC: 63 MG/DL (ref 40–75)
HDLC SERPL: 25.4 % (ref 20–50)
HGB BLD-MCNC: 11.9 G/DL (ref 12–16)
IMM GRANULOCYTES # BLD AUTO: 0.01 K/UL (ref 0–0.04)
IMM GRANULOCYTES NFR BLD AUTO: 0.2 % (ref 0–0.5)
LDLC SERPL CALC-MCNC: 152.6 MG/DL (ref 63–159)
LYMPHOCYTES # BLD AUTO: 2.8 K/UL (ref 1–4.8)
LYMPHOCYTES NFR BLD: 42.5 % (ref 18–48)
MCH RBC QN AUTO: 21.9 PG (ref 27–31)
MCHC RBC AUTO-ENTMCNC: 29.9 G/DL (ref 32–36)
MCV RBC AUTO: 73 FL (ref 82–98)
MONOCYTES # BLD AUTO: 0.5 K/UL (ref 0.3–1)
MONOCYTES NFR BLD: 7 % (ref 4–15)
NEUTROPHILS # BLD AUTO: 3.2 K/UL (ref 1.8–7.7)
NEUTROPHILS NFR BLD: 48.4 % (ref 38–73)
NONHDLC SERPL-MCNC: 185 MG/DL
NRBC BLD-RTO: 0 /100 WBC
PLATELET # BLD AUTO: 175 K/UL (ref 150–350)
PMV BLD AUTO: ABNORMAL FL (ref 9.2–12.9)
POTASSIUM SERPL-SCNC: 4.4 MMOL/L (ref 3.5–5.1)
PROT SERPL-MCNC: 7.9 G/DL (ref 6–8.4)
RBC # BLD AUTO: 5.43 M/UL (ref 4–5.4)
SODIUM SERPL-SCNC: 141 MMOL/L (ref 136–145)
TRIGL SERPL-MCNC: 162 MG/DL (ref 30–150)
TSH SERPL DL<=0.005 MIU/L-ACNC: 1.52 UIU/ML (ref 0.4–4)
WBC # BLD AUTO: 6.57 K/UL (ref 3.9–12.7)

## 2019-08-26 PROCEDURE — 80061 LIPID PANEL: CPT

## 2019-08-26 PROCEDURE — 84443 ASSAY THYROID STIM HORMONE: CPT

## 2019-08-26 PROCEDURE — 85025 COMPLETE CBC W/AUTO DIFF WBC: CPT

## 2019-08-26 PROCEDURE — 80053 COMPREHEN METABOLIC PANEL: CPT

## 2019-08-26 PROCEDURE — 36415 COLL VENOUS BLD VENIPUNCTURE: CPT | Mod: PN

## 2019-08-26 PROCEDURE — 83036 HEMOGLOBIN GLYCOSYLATED A1C: CPT

## 2019-08-27 ENCOUNTER — TELEPHONE (OUTPATIENT)
Dept: PRIMARY CARE CLINIC | Facility: CLINIC | Age: 59
End: 2019-08-27

## 2019-08-27 DIAGNOSIS — Z12.39 SCREENING BREAST EXAMINATION: Primary | ICD-10-CM

## 2019-08-27 NOTE — TELEPHONE ENCOUNTER
----- Message from Vanessa Woodard sent at 8/27/2019  7:12 AM CDT -----  Pt is scheduled for a mammogram on 9/3 and no orders are in the system. Please advise.

## 2019-09-03 ENCOUNTER — TELEPHONE (OUTPATIENT)
Dept: PRIMARY CARE CLINIC | Facility: CLINIC | Age: 59
End: 2019-09-03

## 2019-09-03 ENCOUNTER — OFFICE VISIT (OUTPATIENT)
Dept: PRIMARY CARE CLINIC | Facility: CLINIC | Age: 59
End: 2019-09-03
Payer: COMMERCIAL

## 2019-09-03 ENCOUNTER — HOSPITAL ENCOUNTER (OUTPATIENT)
Dept: RADIOLOGY | Facility: HOSPITAL | Age: 59
Discharge: HOME | End: 2019-09-03
Attending: FAMILY MEDICINE
Payer: COMMERCIAL

## 2019-09-03 VITALS
SYSTOLIC BLOOD PRESSURE: 126 MMHG | WEIGHT: 173.5 LBS | HEART RATE: 72 BPM | DIASTOLIC BLOOD PRESSURE: 70 MMHG | BODY MASS INDEX: 27.23 KG/M2 | HEIGHT: 67 IN | TEMPERATURE: 98 F

## 2019-09-03 VITALS — BODY MASS INDEX: 23.85 KG/M2 | WEIGHT: 150 LBS

## 2019-09-03 DIAGNOSIS — Z12.39 SCREENING BREAST EXAMINATION: ICD-10-CM

## 2019-09-03 DIAGNOSIS — E89.0 POSTABLATIVE HYPOTHYROIDISM: ICD-10-CM

## 2019-09-03 DIAGNOSIS — E78.5 HYPERLIPIDEMIA ASSOCIATED WITH TYPE 2 DIABETES MELLITUS: ICD-10-CM

## 2019-09-03 DIAGNOSIS — E11.9 CONTROLLED TYPE 2 DIABETES MELLITUS WITHOUT COMPLICATION, WITHOUT LONG-TERM CURRENT USE OF INSULIN: ICD-10-CM

## 2019-09-03 DIAGNOSIS — Z12.11 SCREEN FOR COLON CANCER: ICD-10-CM

## 2019-09-03 DIAGNOSIS — E11.69 HYPERLIPIDEMIA ASSOCIATED WITH TYPE 2 DIABETES MELLITUS: ICD-10-CM

## 2019-09-03 DIAGNOSIS — Z00.00 ROUTINE GENERAL MEDICAL EXAMINATION AT A HEALTH CARE FACILITY: Primary | ICD-10-CM

## 2019-09-03 LAB
ALBUMIN/CREAT UR: 3.9 UG/MG (ref 0–30)
CREAT UR-MCNC: 203 MG/DL (ref 15–325)
MICROALBUMIN UR DL<=1MG/L-MCNC: 8 UG/ML

## 2019-09-03 PROCEDURE — 99396 PREV VISIT EST AGE 40-64: CPT | Mod: S$GLB,,, | Performed by: FAMILY MEDICINE

## 2019-09-03 PROCEDURE — 77067 SCR MAMMO BI INCL CAD: CPT | Mod: TC,PN

## 2019-09-03 PROCEDURE — 99999 PR PBB SHADOW E&M-EST. PATIENT-LVL III: CPT | Mod: PBBFAC,,, | Performed by: FAMILY MEDICINE

## 2019-09-03 PROCEDURE — 77063 BREAST TOMOSYNTHESIS BI: CPT | Mod: 26,,, | Performed by: RADIOLOGY

## 2019-09-03 PROCEDURE — 99999 PR PBB SHADOW E&M-EST. PATIENT-LVL III: ICD-10-PCS | Mod: PBBFAC,,, | Performed by: FAMILY MEDICINE

## 2019-09-03 PROCEDURE — 77067 SCR MAMMO BI INCL CAD: CPT | Mod: 26,,, | Performed by: RADIOLOGY

## 2019-09-03 PROCEDURE — 77063 MAMMO DIGITAL SCREENING BILAT WITH TOMOSYNTHESIS_CAD: ICD-10-PCS | Mod: 26,,, | Performed by: RADIOLOGY

## 2019-09-03 PROCEDURE — 99396 PR PREVENTIVE VISIT,EST,40-64: ICD-10-PCS | Mod: S$GLB,,, | Performed by: FAMILY MEDICINE

## 2019-09-03 PROCEDURE — 77067 MAMMO DIGITAL SCREENING BILAT WITH TOMOSYNTHESIS_CAD: ICD-10-PCS | Mod: 26,,, | Performed by: RADIOLOGY

## 2019-09-03 PROCEDURE — 82043 UR ALBUMIN QUANTITATIVE: CPT

## 2019-09-03 RX ORDER — ATORVASTATIN CALCIUM 20 MG/1
20 TABLET, FILM COATED ORAL DAILY
Qty: 90 TABLET | Refills: 3 | Status: SHIPPED | OUTPATIENT
Start: 2019-09-03 | End: 2020-08-18 | Stop reason: SDUPTHER

## 2019-09-03 RX ORDER — LEVOTHYROXINE SODIUM 112 UG/1
112 TABLET ORAL EVERY MORNING
Qty: 90 TABLET | Refills: 3 | Status: SHIPPED | OUTPATIENT
Start: 2019-09-03 | End: 2020-08-18 | Stop reason: SDUPTHER

## 2019-09-03 RX ORDER — LOSARTAN POTASSIUM 25 MG/1
25 TABLET ORAL DAILY
Qty: 90 TABLET | Refills: 3 | Status: SHIPPED | OUTPATIENT
Start: 2019-09-03 | End: 2020-08-18 | Stop reason: SDUPTHER

## 2019-09-03 NOTE — PATIENT INSTRUCTIONS
Due for  Vaccines  - pneumonia, shingles, flu - at your pharmacy (she declines)     Follow with psychiatrist    Continue medications    FOR  DIABETES - diet controlled   ==================================  SEE ME every  6 MONTHS with  BLOODWORK one week PRIOR  ===================================    Your 1# medicine is  EXERCISE  - huffing & puffing for 20  MINUTES EVERY DAY - check your sugars & you'll see them go down    The future risks of uncontrolled diabetes - include heart attack, stroke, neuropathy (pain in hands & feet that could lead to infection and amputation), nephropathy (leading to kidney dialysis) , and blindness     To prevent complications that can be treated early, please see your  opthalmologist EYE DOCTOR YEARLY      Always wear protective SHOES    Focus on NO SUGAR and no sugar substitutes (splenda,s tevia, etc) IN YOUR DRINKS. With respect to food - LOW CARBOHYDRATES - switch to whole wheat & brown rice.    Decrease & try to stop ALCOHOL, WHITE BREAD, WHITE PASTA, WHITE RICE , WHITE POTATOES- which all turn into sugar.    EAT an EXTRA VEGETABLE A DAY than you already do.    Consider reading the book -  End Of Diabetes by Dr Miramontes    Once YEARLY I will check basic labs CBC, CMP, fasting lipids, TSH, Hga1C prior to your visit      ----------------------------        ==============================================================  I'd like to advise you on current CANCER SCREENING recommendations:  ~~~~~~~~~~~~~~~~~~~~~~~~~~~~~~~~~~~~~~~~~~~~~~~~~~~~~~~~~~~~~  PAP SMEAR to evaluate for cervical cancer screening  Every 3 years (or 30 - 64 yo, every 5 years with HPV co-testing).   MAMMOGRAM  every 1-2 years, from 50 - 74 years old. We can discuss your risk at 40 & determine whether to get mammogram sooner  Of course, I can perform this sooner if there is family history of cancer, or if you have problems or questions    ==============================  RECOMMENDATIONS FOR  FEMALES  ==============================  Your #1 MEDICINE is DAILY EXERCISE - 15-20 minutes of huffing & puffing EVERY DAY.     Prevent the #1 cause of death- cardiovascular disease (HEART ATTACK & STROKE) by checking for normal blood pressure, cholesterol, sugars, & by not smoking.     VACCINES: Yearly FLU shot, PNEUMONIA shot after 65,  SHINGLES shot after 50    Screening colonoscopy at AGE  50 & every 10 years to check for COLON CANCER,  one of the most common & preventable cancers (Or FIT kit yearly) Repeat in 3 years if POLYP found     I recommend  high fiber (5 fresh fruits or vegetables daily), low fat diet and aerobic  exercise (huffing/ puffing/ sweating for 20 min straight at least 4 days a week)    Follow up yearly with mammogram, fasting lipids, CMP, CBC prior.   ==============================================================

## 2019-09-03 NOTE — PROGRESS NOTES
Subjective:      Patient ID: Shara Combs is a 58 y.o. female.    Chief Complaint: Annual Exam    Here today for general exam & DM - 5.7% diet controlled.     She has been following with the psychiatrist, but does not know his name.  She is unable to work but feels much better mentally.  She is walking on a daily basis with her .  She feels more in control of things when her mental health is stable.    Denies acute symptoms such as nocturia, polyuria, unexplained weight loss or blurred vision.    Eye evaluation yearly    Urine microalbumin today    Denies numbness, tingling sensation, or known h/o peripheral neuropathy.     Denies any chest pain, shortness of breath, nausea vomiting constipation diarrhea, blood in stool, heartburn      Current Outpatient Medications:     doxepin (SINEQUAN) 100 MG capsule, Take 100 mg by mouth once daily., Disp: , Rfl:     levothyroxine (SYNTHROID) 112 MCG tablet, Take 1 tablet (112 mcg total) by mouth every morning., Disp: 90 tablet, Rfl: 3    lorazepam (ATIVAN) 0.5 MG tablet, Take 0.5 mg by mouth 2 (two) times daily., Disp: , Rfl: 1    losartan (COZAAR) 25 MG tablet, Take 1 tablet (25 mg total) by mouth once daily., Disp: 90 tablet, Rfl: 3    quetiapine (SEROQUEL) 100 MG Tab, Take 100 mg by mouth nightly. at bedtime., Disp: , Rfl: 1    sertraline (ZOLOFT) 25 MG tablet, Take 25 mg by mouth once daily., Disp: , Rfl: 0    trazodone (DESYREL) 150 MG tablet, Take 150 mg by mouth nightly. at bedtime., Disp: , Rfl: 1    Pravastatin 40 qd    Lab Results   Component Value Date    HGBA1C 5.7 (H) 08/26/2019    HGBA1C 5.3 03/06/2018    HGBA1C 5.3 09/11/2017     Lab Results   Component Value Date    MICALBCREAT 4.5 03/08/2018     Lab Results   Component Value Date    LDLCALC 152.6 08/26/2019    LDLCALC 124.4 03/06/2018    CHOL 248 (H) 08/26/2019    HDL 63 08/26/2019    TRIG 162 (H) 08/26/2019       Lab Results   Component Value Date     08/26/2019    K 4.4  "2019     2019    CO2 29 2019    GLU 96 2019    BUN 13 2019    CREATININE 1.0 2019    CALCIUM 9.6 2019    PROT 7.9 2019    ALBUMIN 4.2 2019    BILITOT 0.5 2019    ALKPHOS 69 2019    AST 34 2019    ALT 15 2019    ANIONGAP 8 2019    ESTGFRAFRICA >60.0 2019    EGFRNONAA >60.0 2019    WBC 6.57 2019    HGB 11.9 (L) 2019    HGB 11.7 (L) 2018    HCT 39.8 2019    MCV 73 (L) 2019     2019    TSH 1.523 2019    HEPCAB Negative 2004       Lab Results   Component Value Date    FSH 3.7 2004    FERRITIN 72.7 2007    IRON 103 2007    TIBC 343 2007    FESATURATED 30 2007         Past Medical History:   Diagnosis Date    Anxiety 2016    psychiatrist & seeing therapist    Controlled type 2 diabetes mellitus without complication, without long-term current use of insulin 2017    diet controlled, took metformin in the past    Hyperlipidemia associated with type 2 diabetes mellitus 2012    Hypothyroidism     after tx with iodine     Past Surgical History:   Procedure Laterality Date     SECTION, CLASSIC       Social History     Social History Narrative    [employer] , , 20yo son, returned to her home in [facility], nonsmoker, consumes ETOH 1-2x/month      GYN here. Never had colonoscopy              Family History   Problem Relation Age of Onset    Hypertension Father     Stroke Father     Thyroid disease Sister      Vitals:    19 1003 19 1034   BP: (!) 132/90 126/70   Pulse: 72    Temp: 98.4 °F (36.9 °C)    Weight: 78.7 kg (173 lb 8 oz)    Height: 5' 6.5" (1.689 m)      Objective:   Physical Exam   Constitutional: She is oriented to person, place, and time. She appears well-developed and well-nourished.   HENT:   Head: Normocephalic and atraumatic.   Right Ear: External ear normal. "   Left Ear: External ear normal.   Nose: Nose normal.   Mouth/Throat: Oropharynx is clear and moist.   Eyes: Pupils are equal, round, and reactive to light. EOM are normal.   Neck: Neck supple. No thyromegaly present.   Cardiovascular: Normal rate, regular rhythm, normal heart sounds and intact distal pulses.   No murmur heard.  Pulmonary/Chest: Effort normal and breath sounds normal. She has no wheezes.   Abdominal: Soft. Bowel sounds are normal. She exhibits no distension and no mass. There is no tenderness. There is no rebound and no guarding.   Musculoskeletal: She exhibits no edema.        Right foot: There is normal range of motion and no deformity.        Left foot: There is normal range of motion and no deformity.        Feet:   Right Foot:   Protective Sensation: 5 sites tested. 5 sites sensed.   Skin Integrity: Negative for ulcer, blister, skin breakdown, erythema or warmth.   Left Foot:   Protective Sensation: 5 sites tested. 5 sites sensed.   Skin Integrity: Negative for ulcer, blister, skin breakdown, erythema or warmth.   Lymphadenopathy:     She has no cervical adenopathy.   Neurological: She is alert and oriented to person, place, and time.   Skin: Skin is warm and dry.   Psychiatric: She has a normal mood and affect. Her behavior is normal.     Assessment:     1. Routine general medical examination at a health care facility    2. Hyperlipidemia associated with type 2 diabetes mellitus    3. Controlled type 2 diabetes mellitus without complication, without long-term current use of insulin    4. Postablative hypothyroidism    5. Screen for colon cancer      Plan:     Orders Placed This Encounter    Fecal Immunochemical Test (iFOBT)    Hemoglobin A1c    MICROALBUMIN / CREATININE RATIO URINE    atorvastatin (LIPITOR) 20 MG tablet    losartan (COZAAR) 25 MG tablet    levothyroxine (SYNTHROID) 112 MCG tablet       Patient Instructions   Due for  Vaccines  - pneumonia, shingles, flu - at your  pharmacy (she declines)     Follow with psychiatrist    Continue medications    FOR  DIABETES - diet controlled   ==================================  SEE ME every  6 MONTHS with  BLOODWORK one week PRIOR  ===================================    Your 1# medicine is  EXERCISE  - huffing & puffing for 20  MINUTES EVERY DAY - check your sugars & you'll see them go down    The future risks of uncontrolled diabetes - include heart attack, stroke, neuropathy (pain in hands & feet that could lead to infection and amputation), nephropathy (leading to kidney dialysis) , and blindness     To prevent complications that can be treated early, please see your  opthalmologist EYE DOCTOR YEARLY      Always wear protective SHOES    Focus on NO SUGAR and no sugar substitutes (splenda,s tevia, etc) IN YOUR DRINKS. With respect to food - LOW CARBOHYDRATES - switch to whole wheat & brown rice.    Decrease & try to stop ALCOHOL, WHITE BREAD, WHITE PASTA, WHITE RICE , WHITE POTATOES- which all turn into sugar.    EAT an EXTRA VEGETABLE A DAY than you already do.    Consider reading the book -  End Of Diabetes by Dr Miramontes    Once YEARLY I will check basic labs CBC, CMP, fasting lipids, TSH, Hga1C prior to your visit      ----------------------------        ==============================================================  I'd like to advise you on current CANCER SCREENING recommendations:  ~~~~~~~~~~~~~~~~~~~~~~~~~~~~~~~~~~~~~~~~~~~~~~~~~~~~~~~~~~~~~  PAP SMEAR to evaluate for cervical cancer screening  Every 3 years (or 30 - 66 yo, every 5 years with HPV co-testing).   MAMMOGRAM  every 1-2 years, from 50 - 74 years old. We can discuss your risk at 40 & determine whether to get mammogram sooner  Of course, I can perform this sooner if there is family history of cancer, or if you have problems or questions    ==============================  RECOMMENDATIONS FOR FEMALES  ==============================  Your #1 MEDICINE is DAILY EXERCISE - 15-20  minutes of huffing & puffing EVERY DAY.     Prevent the #1 cause of death- cardiovascular disease (HEART ATTACK & STROKE) by checking for normal blood pressure, cholesterol, sugars, & by not smoking.     VACCINES: Yearly FLU shot, PNEUMONIA shot after 65,  SHINGLES shot after 50    Screening colonoscopy at AGE  50 & every 10 years to check for COLON CANCER,  one of the most common & preventable cancers (Or FIT kit yearly) Repeat in 3 years if POLYP found     I recommend  high fiber (5 fresh fruits or vegetables daily), low fat diet and aerobic  exercise (huffing/ puffing/ sweating for 20 min straight at least 4 days a week)    Follow up yearly with mammogram, fasting lipids, CMP, CBC prior.   ==============================================================

## 2019-09-03 NOTE — TELEPHONE ENCOUNTER
----- Message from Laura Reid sent at 9/3/2019 12:07 PM CDT -----  Contact: self  [phone]  Patient is calling with the name of her psychiatrist  Mike Martin   [facility] [facility] [address].

## 2020-07-15 RX ORDER — DOXEPIN HYDROCHLORIDE 100 MG/1
CAPSULE ORAL
Qty: 90 CAPSULE | Refills: 3 | Status: SHIPPED | OUTPATIENT
Start: 2020-07-15 | End: 2021-07-09 | Stop reason: SDUPTHER

## 2020-07-27 ENCOUNTER — TELEPHONE (OUTPATIENT)
Dept: PRIMARY CARE CLINIC | Facility: CLINIC | Age: 60
End: 2020-07-27

## 2020-07-27 DIAGNOSIS — Z00.00 ROUTINE GENERAL MEDICAL EXAMINATION AT A HEALTH CARE FACILITY: Primary | ICD-10-CM

## 2020-08-04 ENCOUNTER — PATIENT OUTREACH (OUTPATIENT)
Dept: ADMINISTRATIVE | Facility: HOSPITAL | Age: 60
End: 2020-08-04

## 2020-08-04 NOTE — PROGRESS NOTES
Pre-visit chart review completed.    Patient due for the following    HIV Screening     Shingles Vaccine (1 of 2)    Colorectal Cancer Screening     Cervical Cancer Screening       Patient scheduled to have labs.    Patient gets paps done by PCP.    Colonoscopy only.

## 2020-08-11 ENCOUNTER — LAB VISIT (OUTPATIENT)
Dept: LAB | Facility: HOSPITAL | Age: 60
End: 2020-08-11
Attending: FAMILY MEDICINE
Payer: COMMERCIAL

## 2020-08-11 DIAGNOSIS — E11.9 CONTROLLED TYPE 2 DIABETES MELLITUS WITHOUT COMPLICATION, WITHOUT LONG-TERM CURRENT USE OF INSULIN: ICD-10-CM

## 2020-08-11 DIAGNOSIS — Z00.00 ROUTINE GENERAL MEDICAL EXAMINATION AT A HEALTH CARE FACILITY: ICD-10-CM

## 2020-08-11 LAB
ALBUMIN SERPL BCP-MCNC: 4 G/DL (ref 3.5–5.2)
ALP SERPL-CCNC: 75 U/L (ref 55–135)
ALT SERPL W/O P-5'-P-CCNC: 20 U/L (ref 10–44)
ANION GAP SERPL CALC-SCNC: 6 MMOL/L (ref 8–16)
AST SERPL-CCNC: 40 U/L (ref 10–40)
BASOPHILS # BLD AUTO: 0.02 K/UL (ref 0–0.2)
BASOPHILS NFR BLD: 0.3 % (ref 0–1.9)
BILIRUB SERPL-MCNC: 0.6 MG/DL (ref 0.1–1)
BUN SERPL-MCNC: 9 MG/DL (ref 6–20)
CALCIUM SERPL-MCNC: 9.4 MG/DL (ref 8.7–10.5)
CHLORIDE SERPL-SCNC: 108 MMOL/L (ref 95–110)
CHOLEST SERPL-MCNC: 172 MG/DL (ref 120–199)
CHOLEST/HDLC SERPL: 3.3 {RATIO} (ref 2–5)
CO2 SERPL-SCNC: 28 MMOL/L (ref 23–29)
CREAT SERPL-MCNC: 0.8 MG/DL (ref 0.5–1.4)
DIFFERENTIAL METHOD: ABNORMAL
EOSINOPHIL # BLD AUTO: 0.1 K/UL (ref 0–0.5)
EOSINOPHIL NFR BLD: 1.2 % (ref 0–8)
ERYTHROCYTE [DISTWIDTH] IN BLOOD BY AUTOMATED COUNT: 16.4 % (ref 11.5–14.5)
EST. GFR  (AFRICAN AMERICAN): >60 ML/MIN/1.73 M^2
EST. GFR  (NON AFRICAN AMERICAN): >60 ML/MIN/1.73 M^2
ESTIMATED AVG GLUCOSE: 114 MG/DL (ref 68–131)
GLUCOSE SERPL-MCNC: 98 MG/DL (ref 70–110)
HBA1C MFR BLD HPLC: 5.6 % (ref 4–5.6)
HCT VFR BLD AUTO: 37.3 % (ref 37–48.5)
HDLC SERPL-MCNC: 52 MG/DL (ref 40–75)
HDLC SERPL: 30.2 % (ref 20–50)
HGB BLD-MCNC: 11.4 G/DL (ref 12–16)
IMM GRANULOCYTES # BLD AUTO: 0.01 K/UL (ref 0–0.04)
IMM GRANULOCYTES NFR BLD AUTO: 0.2 % (ref 0–0.5)
LDLC SERPL CALC-MCNC: 100.2 MG/DL (ref 63–159)
LYMPHOCYTES # BLD AUTO: 2.4 K/UL (ref 1–4.8)
LYMPHOCYTES NFR BLD: 40.2 % (ref 18–48)
MCH RBC QN AUTO: 21.8 PG (ref 27–31)
MCHC RBC AUTO-ENTMCNC: 30.6 G/DL (ref 32–36)
MCV RBC AUTO: 72 FL (ref 82–98)
MONOCYTES # BLD AUTO: 0.4 K/UL (ref 0.3–1)
MONOCYTES NFR BLD: 6.1 % (ref 4–15)
NEUTROPHILS # BLD AUTO: 3.1 K/UL (ref 1.8–7.7)
NEUTROPHILS NFR BLD: 52 % (ref 38–73)
NONHDLC SERPL-MCNC: 120 MG/DL
NRBC BLD-RTO: 0 /100 WBC
PLATELET # BLD AUTO: 179 K/UL (ref 150–350)
PMV BLD AUTO: ABNORMAL FL (ref 9.2–12.9)
POTASSIUM SERPL-SCNC: 3.7 MMOL/L (ref 3.5–5.1)
PROT SERPL-MCNC: 7.2 G/DL (ref 6–8.4)
RBC # BLD AUTO: 5.22 M/UL (ref 4–5.4)
SODIUM SERPL-SCNC: 142 MMOL/L (ref 136–145)
TRIGL SERPL-MCNC: 99 MG/DL (ref 30–150)
WBC # BLD AUTO: 5.89 K/UL (ref 3.9–12.7)

## 2020-08-11 PROCEDURE — 86703 HIV-1/HIV-2 1 RESULT ANTBDY: CPT

## 2020-08-11 PROCEDURE — 80053 COMPREHEN METABOLIC PANEL: CPT

## 2020-08-11 PROCEDURE — 85025 COMPLETE CBC W/AUTO DIFF WBC: CPT

## 2020-08-11 PROCEDURE — 80061 LIPID PANEL: CPT

## 2020-08-11 PROCEDURE — 83036 HEMOGLOBIN GLYCOSYLATED A1C: CPT

## 2020-08-11 PROCEDURE — 36415 COLL VENOUS BLD VENIPUNCTURE: CPT | Mod: PN

## 2020-08-12 LAB — HIV 1+2 AB+HIV1 P24 AG SERPL QL IA: NEGATIVE

## 2020-08-18 ENCOUNTER — OFFICE VISIT (OUTPATIENT)
Dept: PRIMARY CARE CLINIC | Facility: CLINIC | Age: 60
End: 2020-08-18
Payer: MEDICARE

## 2020-08-18 VITALS
HEIGHT: 67 IN | BODY MASS INDEX: 25.22 KG/M2 | SYSTOLIC BLOOD PRESSURE: 120 MMHG | DIASTOLIC BLOOD PRESSURE: 78 MMHG | WEIGHT: 160.69 LBS | HEART RATE: 72 BPM

## 2020-08-18 DIAGNOSIS — F43.21 GRIEF: ICD-10-CM

## 2020-08-18 DIAGNOSIS — E89.0 POSTABLATIVE HYPOTHYROIDISM: ICD-10-CM

## 2020-08-18 DIAGNOSIS — F41.9 ANXIETY: ICD-10-CM

## 2020-08-18 DIAGNOSIS — R73.09 ELEVATED GLUCOSE: Primary | ICD-10-CM

## 2020-08-18 DIAGNOSIS — Z12.11 SCREEN FOR COLON CANCER: ICD-10-CM

## 2020-08-18 PROBLEM — E11.9 CONTROLLED TYPE 2 DIABETES MELLITUS WITHOUT COMPLICATION, WITHOUT LONG-TERM CURRENT USE OF INSULIN: Status: RESOLVED | Noted: 2017-05-09 | Resolved: 2020-08-18

## 2020-08-18 PROCEDURE — 99214 OFFICE O/P EST MOD 30 MIN: CPT | Mod: S$PBB,,, | Performed by: FAMILY MEDICINE

## 2020-08-18 PROCEDURE — 99999 PR PBB SHADOW E&M-EST. PATIENT-LVL III: CPT | Mod: PBBFAC,,, | Performed by: FAMILY MEDICINE

## 2020-08-18 PROCEDURE — 99213 OFFICE O/P EST LOW 20 MIN: CPT | Mod: PBBFAC,PN | Performed by: FAMILY MEDICINE

## 2020-08-18 PROCEDURE — 99214 PR OFFICE/OUTPT VISIT, EST, LEVL IV, 30-39 MIN: ICD-10-PCS | Mod: S$PBB,,, | Performed by: FAMILY MEDICINE

## 2020-08-18 PROCEDURE — 99999 PR PBB SHADOW E&M-EST. PATIENT-LVL III: ICD-10-PCS | Mod: PBBFAC,,, | Performed by: FAMILY MEDICINE

## 2020-08-18 RX ORDER — ATORVASTATIN CALCIUM 20 MG/1
20 TABLET, FILM COATED ORAL DAILY
Qty: 90 TABLET | Refills: 3 | Status: SHIPPED | OUTPATIENT
Start: 2020-08-18 | End: 2021-07-09 | Stop reason: SDUPTHER

## 2020-08-18 RX ORDER — LOSARTAN POTASSIUM 25 MG/1
25 TABLET ORAL DAILY
Qty: 90 TABLET | Refills: 3 | Status: SHIPPED | OUTPATIENT
Start: 2020-08-18 | End: 2021-07-09 | Stop reason: SDUPTHER

## 2020-08-18 RX ORDER — LEVOTHYROXINE SODIUM 112 UG/1
112 TABLET ORAL EVERY MORNING
Qty: 90 TABLET | Refills: 3 | Status: SHIPPED | OUTPATIENT
Start: 2020-08-18 | End: 2021-07-09 | Stop reason: SDUPTHER

## 2020-08-18 NOTE — PROGRESS NOTES
Subjective:      Patient ID: Shara Combs is a 59 y.o. female.    Chief Complaint: Medication Management    Here today for review labs, refill meds      She declines Pap smear today    She follows with psychiatrist for treatment of anxiety. She grieves the loss of her sister, but her  is her support.     She takes levothyroxine 112 mcg daily for post ablated hypothyroidism    The 10-year ASCVD risk score (Ashkan SEVERINO Jr., et al., 2013) is: 3.2%    Values used to calculate the score:      Age: 59 years      Sex: Female      Is Non- : Yes      Diabetic: No      Tobacco smoker: No      Systolic Blood Pressure: 120 mmHg      Is BP treated: No      HDL Cholesterol: 52 mg/dL      Total Cholesterol: 172 mg/dL      Denies any chest pain, shortness of breath, nausea vomiting constipation diarrhea, blood in stool, heartburn      Current Outpatient Medications:     atorvastatin (LIPITOR) 20 MG tablet, Take 1 tablet (20 mg total) by mouth once daily., Disp: 90 tablet, Rfl: 3    doxepin (SINEQUAN) 100 MG capsule, TAKE 1 CAPSULE BY MOUTH AT BEDTIME, Disp: 90 capsule, Rfl: 3    levothyroxine (SYNTHROID) 112 MCG tablet, Take 1 tablet (112 mcg total) by mouth every morning., Disp: 90 tablet, Rfl: 3    lorazepam (ATIVAN) 0.5 MG tablet, Take 0.5 mg by mouth 2 (two) times daily., Disp: , Rfl: 1    losartan (COZAAR) 25 MG tablet, Take 1 tablet (25 mg total) by mouth once daily., Disp: 90 tablet, Rfl: 3    quetiapine (SEROQUEL) 100 MG Tab, Take 100 mg by mouth nightly. at bedtime., Disp: , Rfl: 1    sertraline (ZOLOFT) 25 MG tablet, Take 25 mg by mouth once daily., Disp: , Rfl: 0    trazodone (DESYREL) 150 MG tablet, Take 150 mg by mouth nightly. at bedtime., Disp: , Rfl: 1    Lab Results   Component Value Date    HGBA1C 5.6 08/11/2020    HGBA1C 5.7 (H) 08/26/2019    HGBA1C 5.3 03/06/2018     Lab Results   Component Value Date    MICALBCREAT 3.9 09/03/2019     Lab Results   Component Value  "Date    LDLCALC 100.2 2020    LDLCALC 152.6 2019    CHOL 172 2020    HDL 52 2020    TRIG 99 2020       Lab Results   Component Value Date     2020    K 3.7 2020     2020    CO2 28 2020    GLU 98 2020    BUN 9 2020    CREATININE 0.8 2020    CALCIUM 9.4 2020    PROT 7.2 2020    ALBUMIN 4.0 2020    BILITOT 0.6 2020    ALKPHOS 75 2020    AST 40 2020    ALT 20 2020    ANIONGAP 6 (L) 2020    ESTGFRAFRICA >60.0 2020    EGFRNONAA >60.0 2020    WBC 5.89 2020    HGB 11.4 (L) 2020    HGB 11.9 (L) 2019    HCT 37.3 2020    MCV 72 (L) 2020     2020    TSH 1.523 2019    HEPCAB Negative 2004       Lab Results   Component Value Date    FSH 3.7 2004    FERRITIN 72.7 2007    IRON 103 2007    TIBC 343 2007    FESATURATED 30 2007         Past Medical History:   Diagnosis Date    Anxiety 2016    psychiatrist  Mike Martin   [address] [place] [address], & seeing therapist    Elevated glucose 2017    Grief 2020    Sister passed     Hypothyroidism     after tx with iodine     Past Surgical History:   Procedure Laterality Date     SECTION, CLASSIC       Social History     Social History Narrative    [employer] , , 20yo son, returned to her home in [facility], nonsmoker, consumes ETOH 1-2x/month      GYN here. Never had colonoscopy              Family History   Problem Relation Age of Onset    Hypertension Father     Stroke Father     Thyroid disease Sister      Vitals:    20 0916   BP: 120/78   Pulse: 72   Weight: 72.9 kg (160 lb 11.5 oz)   Height: 5' 6.5" (1.689 m)     Objective:   Physical Exam  Constitutional:       Appearance: She is well-developed.   HENT:      Head: Normocephalic and atraumatic.      Nose:      Comments: Wearing mask " due to current COVID 19 pandemic, Nose & mouth exam deferred  Eyes:      Pupils: Pupils are equal, round, and reactive to light.   Neck:      Musculoskeletal: Neck supple.      Thyroid: No thyromegaly.   Cardiovascular:      Rate and Rhythm: Normal rate and regular rhythm.      Heart sounds: Normal heart sounds. No murmur.   Pulmonary:      Effort: Pulmonary effort is normal.      Breath sounds: Normal breath sounds. No wheezing.   Abdominal:      General: Bowel sounds are normal. There is no distension.      Palpations: Abdomen is soft. There is no mass.      Tenderness: There is no abdominal tenderness. There is no guarding or rebound.   Lymphadenopathy:      Cervical: No cervical adenopathy.   Skin:     General: Skin is warm and dry.   Neurological:      Mental Status: She is alert and oriented to person, place, and time.   Psychiatric:         Behavior: Behavior normal.       Assessment:     1. Elevated glucose    2. Postablative hypothyroidism    3. Screen for colon cancer    4. Anxiety    5. Grief      Plan:     Orders Placed This Encounter    Fecal Immunochemical Test (iFOBT)    losartan (COZAAR) 25 MG tablet    levothyroxine (SYNTHROID) 112 MCG tablet    atorvastatin (LIPITOR) 20 MG tablet     Continue medications    Follow with psychiatrist    She declines pap smear today with grief of loss of sister    FOR elevated glucose, YOUR #1 MEDICATION IS EXERCISE --  ===============================================    Try to walk for a continuous 20 minutes every day - in your house or outside (huffing & puffing burns calories & strengthens your heart).     Be aware of everything you eat. Read labels.  Try writing it down so you can see where sugars & carbohydrates are creeping into your foods (drinks other than water, salad dressing, snacks)    Remember, all white bread, white flour (used in baking)  white pasta, white rice, white potatoes, chips, crackers, cookies, sweets, sodas (even Gatorade,  Powerade,Koolaid) , sugary coffee & tea,  desserts ----TURN INTO SUGAR.     ==============================================================  I'd like to advise you on current CANCER SCREENING recommendations:  ~~~~~~~~~~~~~~~~~~~~~~~~~~~~~~~~~~~~~~~~~~~~~~~~~~~~~~~~~~~~~  PAP SMEAR to evaluate for cervical cancer screening  Every 3 years (or 30 - 64 yo, every 5 years with HPV co-testing).   MAMMOGRAM  every 1-2 years, from 50 - 74 years old. We can discuss your risk at 40 & determine whether to get mammogram sooner  Of course, I can perform this sooner if there is family history of cancer, or if you have problems or questions    ==============================  RECOMMENDATIONS FOR FEMALES  ==============================  Your #1 MEDICINE is DAILY EXERCISE - 15-20 minutes of huffing & puffing EVERY DAY.     Prevent the #1 cause of death- cardiovascular disease (HEART ATTACK & STROKE) by checking for normal blood pressure, cholesterol, sugars, & by not smoking.     VACCINES: Yearly FLU shot, PNEUMONIA shot after 65,  SHINGLES shot after 50    Screening colonoscopy at AGE  50 & every 10 years to check for COLON CANCER,  one of the most common & preventable cancers (Or FIT kit yearly) Repeat in 3 years if POLYP found     I recommend  high fiber (5 fresh fruits or vegetables daily), low fat diet and aerobic  exercise (huffing/ puffing/ sweating for 20 min straight at least 4 days a week)    Follow up yearly with mammogram, fasting lipids, CMP, CBC prior.   ==============================================================        There are no Patient Instructions on file for this visit.

## 2020-08-27 ENCOUNTER — TELEPHONE (OUTPATIENT)
Dept: PRIMARY CARE CLINIC | Facility: CLINIC | Age: 60
End: 2020-08-27

## 2020-08-27 NOTE — TELEPHONE ENCOUNTER
----- Message from Deanna Brito sent at 8/27/2020 11:46 AM CDT -----  Regarding: refill  Contact: Patient [phone]  Pharmacy does not have RX please resend    Requesting an RX refill or new RX.  Is this a refill or new RX:  resend  RX name and strength: levothyroxine (SYNTHROID) 112 MCG tablet  Directions (copy/paste from chart):  Take 1 tablet (112 mcg total) by mouth every morning. - Oral  Is this a 30 day or 90 day RX:  90  Local pharmacy or mail order pharmacy:  local  Pharmacy name and phone #[id number] - [place] - [address] [facility] [phone] (Phone) [phone] (Fax)

## 2020-08-27 NOTE — TELEPHONE ENCOUNTER
Confirmed with Blaine at [facility] 8/18/20 Rx was received. It was too early for refill, but he did put it through again today.  Rx ready for  today $5 copay.  LMOR adivising pt.

## 2020-10-09 ENCOUNTER — PATIENT MESSAGE (OUTPATIENT)
Dept: ADMINISTRATIVE | Facility: HOSPITAL | Age: 60
End: 2020-10-09

## 2021-01-04 ENCOUNTER — PATIENT MESSAGE (OUTPATIENT)
Dept: ADMINISTRATIVE | Facility: HOSPITAL | Age: 61
End: 2021-01-04

## 2021-03-08 ENCOUNTER — PATIENT MESSAGE (OUTPATIENT)
Dept: ADMINISTRATIVE | Facility: HOSPITAL | Age: 61
End: 2021-03-08

## 2021-04-05 ENCOUNTER — PATIENT MESSAGE (OUTPATIENT)
Dept: ADMINISTRATIVE | Facility: HOSPITAL | Age: 61
End: 2021-04-05

## 2021-06-08 NOTE — TELEPHONE ENCOUNTER
----- Message from Jenni Kuhn sent at 7/27/2020 11:56 AM CDT -----  Contact: Shara [phone]  Would like to receive medical advice.    Would they like a call back or a response via [facility]:  Call back     Additional information:  Calling to speak to the nurse the pt states she was suppose to have a follow up appt in March. The pt would like to know if she still needs to come in.               4

## 2021-07-02 ENCOUNTER — TELEPHONE (OUTPATIENT)
Dept: INTERNAL MEDICINE | Facility: CLINIC | Age: 61
End: 2021-07-02

## 2021-07-06 ENCOUNTER — TELEPHONE (OUTPATIENT)
Dept: INTERNAL MEDICINE | Facility: CLINIC | Age: 61
End: 2021-07-06

## 2021-07-06 ENCOUNTER — PATIENT MESSAGE (OUTPATIENT)
Dept: ADMINISTRATIVE | Facility: HOSPITAL | Age: 61
End: 2021-07-06

## 2021-07-06 ENCOUNTER — LAB VISIT (OUTPATIENT)
Dept: LAB | Facility: HOSPITAL | Age: 61
End: 2021-07-06
Attending: FAMILY MEDICINE
Payer: MEDICARE

## 2021-07-06 DIAGNOSIS — R73.01 IFG (IMPAIRED FASTING GLUCOSE): ICD-10-CM

## 2021-07-06 DIAGNOSIS — Z00.00 ANNUAL PHYSICAL EXAM: Primary | ICD-10-CM

## 2021-07-06 DIAGNOSIS — Z13.6 ENCOUNTER FOR LIPID SCREENING FOR CARDIOVASCULAR DISEASE: ICD-10-CM

## 2021-07-06 DIAGNOSIS — Z00.00 ANNUAL PHYSICAL EXAM: ICD-10-CM

## 2021-07-06 DIAGNOSIS — Z13.220 ENCOUNTER FOR LIPID SCREENING FOR CARDIOVASCULAR DISEASE: ICD-10-CM

## 2021-07-06 DIAGNOSIS — Z13.29 SCREENING FOR HYPOTHYROIDISM: ICD-10-CM

## 2021-07-06 LAB
ALBUMIN SERPL BCP-MCNC: 4 G/DL (ref 3.5–5.2)
ALP SERPL-CCNC: 71 U/L (ref 55–135)
ALT SERPL W/O P-5'-P-CCNC: 23 U/L (ref 10–44)
ANION GAP SERPL CALC-SCNC: 5 MMOL/L (ref 8–16)
AST SERPL-CCNC: 34 U/L (ref 10–40)
BASOPHILS # BLD AUTO: 0.04 K/UL (ref 0–0.2)
BASOPHILS NFR BLD: 0.6 % (ref 0–1.9)
BILIRUB SERPL-MCNC: 0.2 MG/DL (ref 0.1–1)
BUN SERPL-MCNC: 13 MG/DL (ref 6–20)
CALCIUM SERPL-MCNC: 9.3 MG/DL (ref 8.7–10.5)
CHLORIDE SERPL-SCNC: 103 MMOL/L (ref 95–110)
CHOLEST SERPL-MCNC: 192 MG/DL (ref 120–199)
CHOLEST/HDLC SERPL: 4.4 {RATIO} (ref 2–5)
CO2 SERPL-SCNC: 31 MMOL/L (ref 23–29)
CREAT SERPL-MCNC: 0.9 MG/DL (ref 0.5–1.4)
DIFFERENTIAL METHOD: ABNORMAL
EOSINOPHIL # BLD AUTO: 0.1 K/UL (ref 0–0.5)
EOSINOPHIL NFR BLD: 1 % (ref 0–8)
ERYTHROCYTE [DISTWIDTH] IN BLOOD BY AUTOMATED COUNT: 17 % (ref 11.5–14.5)
EST. GFR  (AFRICAN AMERICAN): >60 ML/MIN/1.73 M^2
EST. GFR  (NON AFRICAN AMERICAN): >60 ML/MIN/1.73 M^2
ESTIMATED AVG GLUCOSE: 117 MG/DL (ref 68–131)
GLUCOSE SERPL-MCNC: 113 MG/DL (ref 70–110)
HBA1C MFR BLD: 5.7 % (ref 4–5.6)
HCT VFR BLD AUTO: 37.2 % (ref 37–48.5)
HDLC SERPL-MCNC: 44 MG/DL (ref 40–75)
HDLC SERPL: 22.9 % (ref 20–50)
HGB BLD-MCNC: 11.1 G/DL (ref 12–16)
IMM GRANULOCYTES # BLD AUTO: 0.01 K/UL (ref 0–0.04)
IMM GRANULOCYTES NFR BLD AUTO: 0.1 % (ref 0–0.5)
LDLC SERPL CALC-MCNC: 113.8 MG/DL (ref 63–159)
LYMPHOCYTES # BLD AUTO: 2.6 K/UL (ref 1–4.8)
LYMPHOCYTES NFR BLD: 37.1 % (ref 18–48)
MCH RBC QN AUTO: 21.2 PG (ref 27–31)
MCHC RBC AUTO-ENTMCNC: 29.8 G/DL (ref 32–36)
MCV RBC AUTO: 71 FL (ref 82–98)
MONOCYTES # BLD AUTO: 0.4 K/UL (ref 0.3–1)
MONOCYTES NFR BLD: 6.1 % (ref 4–15)
NEUTROPHILS # BLD AUTO: 3.8 K/UL (ref 1.8–7.7)
NEUTROPHILS NFR BLD: 55.1 % (ref 38–73)
NONHDLC SERPL-MCNC: 148 MG/DL
NRBC BLD-RTO: 0 /100 WBC
PLATELET # BLD AUTO: 156 K/UL (ref 150–450)
PMV BLD AUTO: ABNORMAL FL (ref 9.2–12.9)
POTASSIUM SERPL-SCNC: 3.6 MMOL/L (ref 3.5–5.1)
PROT SERPL-MCNC: 7.2 G/DL (ref 6–8.4)
RBC # BLD AUTO: 5.23 M/UL (ref 4–5.4)
SODIUM SERPL-SCNC: 139 MMOL/L (ref 136–145)
TRIGL SERPL-MCNC: 171 MG/DL (ref 30–150)
WBC # BLD AUTO: 6.93 K/UL (ref 3.9–12.7)

## 2021-07-06 PROCEDURE — 80053 COMPREHEN METABOLIC PANEL: CPT | Performed by: NURSE PRACTITIONER

## 2021-07-06 PROCEDURE — 85025 COMPLETE CBC W/AUTO DIFF WBC: CPT | Performed by: NURSE PRACTITIONER

## 2021-07-06 PROCEDURE — 80061 LIPID PANEL: CPT | Performed by: NURSE PRACTITIONER

## 2021-07-06 PROCEDURE — 83036 HEMOGLOBIN GLYCOSYLATED A1C: CPT | Performed by: NURSE PRACTITIONER

## 2021-07-06 PROCEDURE — 36415 COLL VENOUS BLD VENIPUNCTURE: CPT | Mod: PN | Performed by: NURSE PRACTITIONER

## 2021-07-07 ENCOUNTER — PATIENT MESSAGE (OUTPATIENT)
Dept: ADMINISTRATIVE | Facility: HOSPITAL | Age: 61
End: 2021-07-07

## 2021-07-09 ENCOUNTER — OFFICE VISIT (OUTPATIENT)
Dept: INTERNAL MEDICINE | Facility: CLINIC | Age: 61
End: 2021-07-09
Payer: MEDICARE

## 2021-07-09 VITALS
RESPIRATION RATE: 16 BRPM | HEIGHT: 67 IN | WEIGHT: 165.56 LBS | BODY MASS INDEX: 25.99 KG/M2 | TEMPERATURE: 97 F | SYSTOLIC BLOOD PRESSURE: 118 MMHG | HEART RATE: 60 BPM | DIASTOLIC BLOOD PRESSURE: 78 MMHG

## 2021-07-09 DIAGNOSIS — F41.9 ANXIETY: ICD-10-CM

## 2021-07-09 DIAGNOSIS — E78.00 HYPERCHOLESTEROLEMIA: ICD-10-CM

## 2021-07-09 DIAGNOSIS — R73.09 ELEVATED GLUCOSE: ICD-10-CM

## 2021-07-09 DIAGNOSIS — Z12.11 SCREEN FOR COLON CANCER: ICD-10-CM

## 2021-07-09 DIAGNOSIS — I10 ESSENTIAL HYPERTENSION: ICD-10-CM

## 2021-07-09 DIAGNOSIS — Z00.00 ANNUAL PHYSICAL EXAM: Primary | ICD-10-CM

## 2021-07-09 DIAGNOSIS — E89.0 POSTABLATIVE HYPOTHYROIDISM: ICD-10-CM

## 2021-07-09 DIAGNOSIS — Z12.31 ENCOUNTER FOR SCREENING MAMMOGRAM FOR MALIGNANT NEOPLASM OF BREAST: ICD-10-CM

## 2021-07-09 PROCEDURE — 99214 OFFICE O/P EST MOD 30 MIN: CPT | Mod: S$PBB,,, | Performed by: NURSE PRACTITIONER

## 2021-07-09 PROCEDURE — 99214 PR OFFICE/OUTPT VISIT, EST, LEVL IV, 30-39 MIN: ICD-10-PCS | Mod: S$PBB,,, | Performed by: NURSE PRACTITIONER

## 2021-07-09 PROCEDURE — 99999 PR PBB SHADOW E&M-EST. PATIENT-LVL III: CPT | Mod: PBBFAC,,, | Performed by: NURSE PRACTITIONER

## 2021-07-09 PROCEDURE — 99999 PR PBB SHADOW E&M-EST. PATIENT-LVL III: ICD-10-PCS | Mod: PBBFAC,,, | Performed by: NURSE PRACTITIONER

## 2021-07-09 RX ORDER — ATORVASTATIN CALCIUM 20 MG/1
20 TABLET, FILM COATED ORAL DAILY
Qty: 90 TABLET | Refills: 3 | Status: SHIPPED | OUTPATIENT
Start: 2021-07-09 | End: 2022-06-07 | Stop reason: SDUPTHER

## 2021-07-09 RX ORDER — LEVOTHYROXINE SODIUM 112 UG/1
112 TABLET ORAL EVERY MORNING
Qty: 90 TABLET | Refills: 3 | Status: SHIPPED | OUTPATIENT
Start: 2021-07-09 | End: 2022-06-07 | Stop reason: SDUPTHER

## 2021-07-09 RX ORDER — LOSARTAN POTASSIUM 25 MG/1
25 TABLET ORAL DAILY
Qty: 90 TABLET | Refills: 3 | Status: SHIPPED | OUTPATIENT
Start: 2021-07-09 | End: 2022-06-07 | Stop reason: SDUPTHER

## 2021-07-09 RX ORDER — DOXEPIN HYDROCHLORIDE 100 MG/1
100 CAPSULE ORAL NIGHTLY
Qty: 90 CAPSULE | Refills: 3 | Status: SHIPPED | OUTPATIENT
Start: 2021-07-09

## 2021-07-10 ENCOUNTER — TELEPHONE (OUTPATIENT)
Dept: INTERNAL MEDICINE | Facility: CLINIC | Age: 61
End: 2021-07-10

## 2021-10-05 ENCOUNTER — PATIENT MESSAGE (OUTPATIENT)
Dept: ADMINISTRATIVE | Facility: HOSPITAL | Age: 61
End: 2021-10-05

## 2022-01-18 ENCOUNTER — PATIENT MESSAGE (OUTPATIENT)
Dept: ADMINISTRATIVE | Facility: HOSPITAL | Age: 62
End: 2022-01-18
Payer: MEDICARE

## 2022-05-30 ENCOUNTER — PATIENT MESSAGE (OUTPATIENT)
Dept: ADMINISTRATIVE | Facility: HOSPITAL | Age: 62
End: 2022-05-30
Payer: MEDICARE

## 2022-06-07 ENCOUNTER — OFFICE VISIT (OUTPATIENT)
Dept: PRIMARY CARE CLINIC | Facility: CLINIC | Age: 62
End: 2022-06-07
Payer: MEDICARE

## 2022-06-07 ENCOUNTER — LAB VISIT (OUTPATIENT)
Dept: LAB | Facility: HOSPITAL | Age: 62
End: 2022-06-07
Attending: INTERNAL MEDICINE
Payer: MEDICARE

## 2022-06-07 VITALS
BODY MASS INDEX: 26.3 KG/M2 | DIASTOLIC BLOOD PRESSURE: 80 MMHG | WEIGHT: 167.56 LBS | HEART RATE: 63 BPM | TEMPERATURE: 98 F | HEIGHT: 67 IN | SYSTOLIC BLOOD PRESSURE: 130 MMHG | OXYGEN SATURATION: 98 %

## 2022-06-07 DIAGNOSIS — D50.9 IRON DEFICIENCY ANEMIA, UNSPECIFIED IRON DEFICIENCY ANEMIA TYPE: ICD-10-CM

## 2022-06-07 DIAGNOSIS — F41.9 ANXIETY: ICD-10-CM

## 2022-06-07 DIAGNOSIS — Z00.00 ROUTINE GENERAL MEDICAL EXAMINATION AT A HEALTH CARE FACILITY: Primary | ICD-10-CM

## 2022-06-07 DIAGNOSIS — I10 BENIGN ESSENTIAL HTN: ICD-10-CM

## 2022-06-07 DIAGNOSIS — I10 ESSENTIAL HYPERTENSION: ICD-10-CM

## 2022-06-07 DIAGNOSIS — E78.2 COMBINED HYPERLIPIDEMIA: ICD-10-CM

## 2022-06-07 DIAGNOSIS — R73.03 PREDIABETES: ICD-10-CM

## 2022-06-07 DIAGNOSIS — Z12.31 OTHER SCREENING MAMMOGRAM: ICD-10-CM

## 2022-06-07 DIAGNOSIS — E89.0 POSTABLATIVE HYPOTHYROIDISM: ICD-10-CM

## 2022-06-07 DIAGNOSIS — E78.00 HYPERCHOLESTEROLEMIA: ICD-10-CM

## 2022-06-07 DIAGNOSIS — Z00.00 ROUTINE GENERAL MEDICAL EXAMINATION AT A HEALTH CARE FACILITY: ICD-10-CM

## 2022-06-07 LAB
ALBUMIN SERPL BCP-MCNC: 4 G/DL (ref 3.5–5.2)
ALP SERPL-CCNC: 75 U/L (ref 55–135)
ALT SERPL W/O P-5'-P-CCNC: 20 U/L (ref 10–44)
ANION GAP SERPL CALC-SCNC: 6 MMOL/L (ref 8–16)
AST SERPL-CCNC: 34 U/L (ref 10–40)
BASOPHILS # BLD AUTO: 0.02 K/UL (ref 0–0.2)
BASOPHILS NFR BLD: 0.3 % (ref 0–1.9)
BILIRUB SERPL-MCNC: 0.5 MG/DL (ref 0.1–1)
BUN SERPL-MCNC: 10 MG/DL (ref 6–20)
CALCIUM SERPL-MCNC: 9.4 MG/DL (ref 8.7–10.5)
CHLORIDE SERPL-SCNC: 103 MMOL/L (ref 95–110)
CHOLEST SERPL-MCNC: 191 MG/DL (ref 120–199)
CHOLEST/HDLC SERPL: 3.5 {RATIO} (ref 2–5)
CO2 SERPL-SCNC: 28 MMOL/L (ref 23–29)
CREAT SERPL-MCNC: 0.8 MG/DL (ref 0.5–1.4)
DIFFERENTIAL METHOD: ABNORMAL
EOSINOPHIL # BLD AUTO: 0.1 K/UL (ref 0–0.5)
EOSINOPHIL NFR BLD: 0.9 % (ref 0–8)
ERYTHROCYTE [DISTWIDTH] IN BLOOD BY AUTOMATED COUNT: 16.3 % (ref 11.5–14.5)
EST. GFR  (AFRICAN AMERICAN): >60 ML/MIN/1.73 M^2
EST. GFR  (NON AFRICAN AMERICAN): >60 ML/MIN/1.73 M^2
ESTIMATED AVG GLUCOSE: 126 MG/DL (ref 68–131)
GLUCOSE SERPL-MCNC: 101 MG/DL (ref 70–110)
HBA1C MFR BLD: 6 % (ref 4–5.6)
HCT VFR BLD AUTO: 39.3 % (ref 37–48.5)
HDLC SERPL-MCNC: 55 MG/DL (ref 40–75)
HDLC SERPL: 28.8 % (ref 20–50)
HGB BLD-MCNC: 11.8 G/DL (ref 12–16)
IMM GRANULOCYTES # BLD AUTO: 0.02 K/UL (ref 0–0.04)
IMM GRANULOCYTES NFR BLD AUTO: 0.3 % (ref 0–0.5)
LDLC SERPL CALC-MCNC: 119.6 MG/DL (ref 63–159)
LYMPHOCYTES # BLD AUTO: 2.3 K/UL (ref 1–4.8)
LYMPHOCYTES NFR BLD: 33.8 % (ref 18–48)
MCH RBC QN AUTO: 21.7 PG (ref 27–31)
MCHC RBC AUTO-ENTMCNC: 30 G/DL (ref 32–36)
MCV RBC AUTO: 72 FL (ref 82–98)
MONOCYTES # BLD AUTO: 0.4 K/UL (ref 0.3–1)
MONOCYTES NFR BLD: 6.2 % (ref 4–15)
NEUTROPHILS # BLD AUTO: 3.9 K/UL (ref 1.8–7.7)
NEUTROPHILS NFR BLD: 58.5 % (ref 38–73)
NONHDLC SERPL-MCNC: 136 MG/DL
NRBC BLD-RTO: 0 /100 WBC
PLATELET # BLD AUTO: 187 K/UL (ref 150–450)
PMV BLD AUTO: ABNORMAL FL (ref 9.2–12.9)
POTASSIUM SERPL-SCNC: 3.9 MMOL/L (ref 3.5–5.1)
PROT SERPL-MCNC: 7.5 G/DL (ref 6–8.4)
RBC # BLD AUTO: 5.45 M/UL (ref 4–5.4)
SODIUM SERPL-SCNC: 137 MMOL/L (ref 136–145)
TRIGL SERPL-MCNC: 82 MG/DL (ref 30–150)
TSH SERPL DL<=0.005 MIU/L-ACNC: 1 UIU/ML (ref 0.4–4)
WBC # BLD AUTO: 6.65 K/UL (ref 3.9–12.7)

## 2022-06-07 PROCEDURE — 99999 PR PBB SHADOW E&M-EST. PATIENT-LVL IV: CPT | Mod: PBBFAC,,, | Performed by: INTERNAL MEDICINE

## 2022-06-07 PROCEDURE — 80053 COMPREHEN METABOLIC PANEL: CPT | Performed by: INTERNAL MEDICINE

## 2022-06-07 PROCEDURE — 85025 COMPLETE CBC W/AUTO DIFF WBC: CPT | Performed by: INTERNAL MEDICINE

## 2022-06-07 PROCEDURE — 99214 OFFICE O/P EST MOD 30 MIN: CPT | Mod: S$PBB,,, | Performed by: INTERNAL MEDICINE

## 2022-06-07 PROCEDURE — 83036 HEMOGLOBIN GLYCOSYLATED A1C: CPT | Performed by: INTERNAL MEDICINE

## 2022-06-07 PROCEDURE — 99999 PR PBB SHADOW E&M-EST. PATIENT-LVL IV: ICD-10-PCS | Mod: PBBFAC,,, | Performed by: INTERNAL MEDICINE

## 2022-06-07 PROCEDURE — 99214 PR OFFICE/OUTPT VISIT, EST, LEVL IV, 30-39 MIN: ICD-10-PCS | Mod: S$PBB,,, | Performed by: INTERNAL MEDICINE

## 2022-06-07 PROCEDURE — 36415 COLL VENOUS BLD VENIPUNCTURE: CPT | Mod: PN | Performed by: INTERNAL MEDICINE

## 2022-06-07 PROCEDURE — 80061 LIPID PANEL: CPT | Performed by: INTERNAL MEDICINE

## 2022-06-07 PROCEDURE — 84443 ASSAY THYROID STIM HORMONE: CPT | Performed by: INTERNAL MEDICINE

## 2022-06-07 RX ORDER — LEVOTHYROXINE SODIUM 112 UG/1
112 TABLET ORAL EVERY MORNING
Qty: 90 TABLET | Refills: 3 | Status: SHIPPED | OUTPATIENT
Start: 2022-06-07 | End: 2023-06-20 | Stop reason: SDUPTHER

## 2022-06-07 RX ORDER — ATORVASTATIN CALCIUM 20 MG/1
20 TABLET, FILM COATED ORAL DAILY
Qty: 90 TABLET | Refills: 3 | Status: SHIPPED | OUTPATIENT
Start: 2022-06-07 | End: 2023-06-20 | Stop reason: SDUPTHER

## 2022-06-07 RX ORDER — LOSARTAN POTASSIUM 25 MG/1
25 TABLET ORAL DAILY
Qty: 90 TABLET | Refills: 3 | Status: SHIPPED | OUTPATIENT
Start: 2022-06-07 | End: 2023-06-20 | Stop reason: SDUPTHER

## 2022-06-07 RX ORDER — DOXEPIN HYDROCHLORIDE 150 MG/1
150 CAPSULE ORAL NIGHTLY
COMMUNITY
Start: 2022-03-11 | End: 2023-06-20

## 2022-06-07 RX ORDER — SERTRALINE HYDROCHLORIDE 100 MG/1
100 TABLET, FILM COATED ORAL 2 TIMES DAILY
COMMUNITY
Start: 2022-06-05

## 2022-06-07 NOTE — PROGRESS NOTES
Hello!    Your blood count (Hemoglobin) does show that you have mild anemia. Focus on eating  more IRON RICH FOODS every day -  Red meat, soybeans (edamame), lentils, raw or cooked spinach, beef, beans (lima, navy, kidney, xiong), turkey & chicken dark meat, Oysters, chickpeas (hummus), fortified cereal, pumpkin seeds, sesame seeds.    Your A1c is 6.0%, showing prediabetes. No medications at this time but continue to watch your sugar intake.  Rest of your electrolytes are unremarkable.    Your kidney (BUN, Creatinine and GFT) function is unremarkable.   Your liver (AST, ALT) function is unremarkable.  These are the filters in your body for medicine, food and liquids that you ingest.    Your Cholesterol is NORMAL. Continue to focus on low fat, high fiber foods and aerobic exericse (huffing & puffing) for at least 20 minutes most days of the week.    Your Thyroid numbers are normal.    No changes in your treatment is needed.

## 2022-06-07 NOTE — PROGRESS NOTES
Subjective:      Patient ID: Shara Combs is a 61 y.o. female.    Chief Complaint: Annual Exam and Medication Refill    Here today for general exam.     Anxiety: Follows with psychiatrist for anxiety. She has been going through a lot. She reports that her girlfriend was recently diagnosed with breast cancer and underwent mastectomy. She has been under huge amount of stress due to that. Discussed ways to manage stress during visit. She declines therapy for now.    Post ablated hypothyroidism: Hx of hyperthyroidism s/p ablation. Continue to take levothyroxine 112mcg. No weight gain/weight loss. See above for anxiety. Check TSH this visit.    HLD: Lipid panel ordered this year. Continue current medication regimen    HTN: Office BP well controlled 130/80. No CP/SOB/lightheadedness/dizziness/palpitations. Continue losartan.    Denies any chest pain, shortness of breath, nausea vomiting constipation diarrhea, blood in stool, heartburn    Mammogram - 7/2019, ordered today  Colonoscopy - declined for now  Pap smear - declined for now    Review of Systems   Constitutional: Negative for chills, fever and weight loss.   HENT: Negative for congestion, ear pain and sore throat.    Eyes: Negative for double vision.   Respiratory: Negative for cough and shortness of breath.    Cardiovascular: Negative for chest pain, palpitations and leg swelling.   Gastrointestinal: Negative for abdominal pain, heartburn, nausea and vomiting.   Skin: Negative for rash.   Neurological: Negative for dizziness, tingling and headaches.   Psychiatric/Behavioral: Negative for depression.          Current Outpatient Medications:     doxepin (SINEQUAN) 100 MG capsule, Take 1 capsule (100 mg total) by mouth every evening., Disp: 90 capsule, Rfl: 3    lorazepam (ATIVAN) 0.5 MG tablet, Take 0.5 mg by mouth 2 (two) times daily., Disp: , Rfl: 1    quetiapine (SEROQUEL) 100 MG Tab, Take 100 mg by mouth nightly. at bedtime., Disp: , Rfl: 1     sertraline (ZOLOFT) 25 MG tablet, Take 25 mg by mouth once daily., Disp: , Rfl: 0    trazodone (DESYREL) 150 MG tablet, Take 150 mg by mouth nightly. at bedtime., Disp: , Rfl: 1    atorvastatin (LIPITOR) 20 MG tablet, Take 1 tablet (20 mg total) by mouth once daily., Disp: 90 tablet, Rfl: 3    doxepin (SINEQUAN) 150 MG Cap, Take 150 mg by mouth nightly., Disp: , Rfl:     levothyroxine (SYNTHROID) 112 MCG tablet, Take 1 tablet (112 mcg total) by mouth every morning., Disp: 90 tablet, Rfl: 3    losartan (COZAAR) 25 MG tablet, Take 1 tablet (25 mg total) by mouth once daily., Disp: 90 tablet, Rfl: 3    sertraline (ZOLOFT) 100 MG tablet, Take 100 mg by mouth 2 (two) times daily., Disp: , Rfl:     Lab Results   Component Value Date    HGBA1C 5.7 (H) 07/06/2021    HGBA1C 5.6 08/11/2020    HGBA1C 5.7 (H) 08/26/2019     Lab Results   Component Value Date    MICALBCREAT 3.9 09/03/2019     Lab Results   Component Value Date    LDLCALC 113.8 07/06/2021    LDLCALC 100.2 08/11/2020    CHOL 192 07/06/2021    HDL 44 07/06/2021    TRIG 171 (H) 07/06/2021       Lab Results   Component Value Date     07/06/2021    K 3.6 07/06/2021     07/06/2021    CO2 31 (H) 07/06/2021     (H) 07/06/2021    BUN 13 07/06/2021    CREATININE 0.9 07/06/2021    CALCIUM 9.3 07/06/2021    PROT 7.2 07/06/2021    ALBUMIN 4.0 07/06/2021    BILITOT 0.2 07/06/2021    ALKPHOS 71 07/06/2021    AST 34 07/06/2021    ALT 23 07/06/2021    ANIONGAP 5 (L) 07/06/2021    ESTGFRAFRICA >60.0 07/06/2021    EGFRNONAA >60.0 07/06/2021    WBC 6.93 07/06/2021    HGB 11.1 (L) 07/06/2021    HGB 11.4 (L) 08/11/2020    HCT 37.2 07/06/2021    MCV 71 (L) 07/06/2021     07/06/2021    TSH 1.523 08/26/2019    HEPCAB Negative 08/31/2004       Lab Results   Component Value Date    FSH 3.7 08/31/2004    FERRITIN 72.7 09/27/2007    IRON 103 09/27/2007    TIBC 343 09/27/2007    FESATURATED 30 09/27/2007         Past Medical History:   Diagnosis Date     "Anxiety 2016    psychiatrist  Mike Martin   [facility] [address], & seeing therapist    Elevated glucose 2017    Grief 2020    Sister passed     Hypothyroidism     after tx with iodine     Past Surgical History:   Procedure Laterality Date     SECTION, CLASSIC       Social History     Social History Narrative                 Family History   Problem Relation Age of Onset    Hypertension Father     Stroke Father     Thyroid disease Sister      Vitals:    22 0805   BP: 130/80   Pulse: 63   Temp: 98.2 °F (36.8 °C)   TempSrc: Oral   SpO2: 98%   Weight: 76 kg (167 lb 8.8 oz)   Height: 5' 6.5" (1.689 m)   PainSc: 0-No pain     Objective:   Physical Exam  Vitals reviewed.   Constitutional:       Appearance: Normal appearance.   HENT:      Head: Normocephalic.      Right Ear: Tympanic membrane, ear canal and external ear normal.      Left Ear: Tympanic membrane, ear canal and external ear normal.      Nose: Nose normal.      Mouth/Throat:      Mouth: Mucous membranes are moist.      Pharynx: Oropharynx is clear.   Eyes:      Conjunctiva/sclera: Conjunctivae normal.      Pupils: Pupils are equal, round, and reactive to light.   Cardiovascular:      Rate and Rhythm: Normal rate and regular rhythm.      Pulses: Normal pulses.   Pulmonary:      Effort: Pulmonary effort is normal.      Breath sounds: Normal breath sounds.   Abdominal:      General: Abdomen is flat. Bowel sounds are normal.      Palpations: Abdomen is soft.   Musculoskeletal:      Cervical back: Neck supple.   Skin:     General: Skin is warm.   Neurological:      General: No focal deficit present.      Mental Status: She is alert.   Psychiatric:         Mood and Affect: Mood normal.       Assessment/Plan     Shara Combs is a 61 y.o.female with:    Routine general medical examination at a health care facility  -     CBC Auto Differential; Future; Expected date: 2022  -     Comprehensive " Metabolic Panel; Future; Expected date: 06/07/2022  -     TSH; Future; Expected date: 06/07/2022  -     Lipid Panel; Future; Expected date: 06/07/2022  -     Hemoglobin A1C; Future; Expected date: 06/07/2022    Anxiety  -     TSH; Future; Expected date: 06/07/2022    Postablative hypothyroidism  -     TSH; Future; Expected date: 06/07/2022    Combined hyperlipidemia  -     Lipid Panel; Future; Expected date: 06/07/2022    Benign essential HTN    Hypercholesterolemia  -     atorvastatin (LIPITOR) 20 MG tablet; Take 1 tablet (20 mg total) by mouth once daily.  Dispense: 90 tablet; Refill: 3  -     levothyroxine (SYNTHROID) 112 MCG tablet; Take 1 tablet (112 mcg total) by mouth every morning.  Dispense: 90 tablet; Refill: 3  -     losartan (COZAAR) 25 MG tablet; Take 1 tablet (25 mg total) by mouth once daily.  Dispense: 90 tablet; Refill: 3    Essential hypertension  -     atorvastatin (LIPITOR) 20 MG tablet; Take 1 tablet (20 mg total) by mouth once daily.  Dispense: 90 tablet; Refill: 3  -     levothyroxine (SYNTHROID) 112 MCG tablet; Take 1 tablet (112 mcg total) by mouth every morning.  Dispense: 90 tablet; Refill: 3  -     losartan (COZAAR) 25 MG tablet; Take 1 tablet (25 mg total) by mouth once daily.  Dispense: 90 tablet; Refill: 3    Iron deficiency anemia, unspecified iron deficiency anemia type  -     CBC Auto Differential; Future; Expected date: 06/07/2022    Prediabetes  -     Hemoglobin A1C; Future; Expected date: 06/07/2022    Other screening mammogram  -     Mammo Digital Screening Bilat; Future; Expected date: 06/07/2022         Chronic conditions status updated as per HPI.  Other than changes above, cont current medications and maintain follow up with specialists.  Return to clinic in Follow up in about 1 year (around 6/7/2023).      Coral Montoya MD  [facility]    Patient Instructions   1. Labs are fasting. Please do not eat or drink anything other than water for 6-8 hrs prior to your lab  work.    2. 12 months for well visit or sooner if needed.       Follow with GYN for female health & cancer prevention    ==============================================================  I'd like to advise you on current CANCER SCREENING recommendations:  ~~~~~~~~~~~~~~~~~~~~~~~~~~~~~~~~~~~~~~~~~~~~~~~~~~~~~~~~~~~~~  PAP SMEAR to evaluate for cervical cancer screening  Every 3 years (or 30 - 66 yo, every 5 years with HPV co-testing).   MAMMOGRAM  every 1-2 years, from 50 - 74 years old. We can discuss your risk at 40 & determine whether to get mammogram sooner  Of course, I can perform this sooner if there is family history of cancer, or if you have problems or questions    ==============================  RECOMMENDATIONS FOR FEMALES  ==============================  Your #1 MEDICINE is DAILY EXERCISE - 15-20 minutes of huffing & puffing EVERY DAY.     Prevent the #1 cause of death- cardiovascular disease (HEART ATTACK & STROKE) by checking for normal blood pressure, cholesterol, sugars, & by not smoking.     VACCINES: Yearly FLU shot, PNEUMONIA shot after 65,  SHINGLES shot after 50    Screening colonoscopy at AGE  50 & every 10 years to check for COLON CANCER,  one of the most common & preventable cancers (Or FIT kit yearly) Repeat in 3 years if POLYP found     I recommend  high fiber (5 fresh fruits or vegetables daily), low fat diet and aerobic  exercise (huffing/ puffing/ sweating for 20 min straight at least 4 days a week)

## 2022-06-07 NOTE — PATIENT INSTRUCTIONS
Labs are fasting. Please do not eat or drink anything other than water for 6-8 hrs prior to your lab work.    12 months for well visit or sooner if needed.       Follow with GYN for female health & cancer prevention    ==============================================================  I'd like to advise you on current CANCER SCREENING recommendations:  ~~~~~~~~~~~~~~~~~~~~~~~~~~~~~~~~~~~~~~~~~~~~~~~~~~~~~~~~~~~~~  PAP SMEAR to evaluate for cervical cancer screening  Every 3 years (or 30 - 64 yo, every 5 years with HPV co-testing).   MAMMOGRAM  every 1-2 years, from 50 - 74 years old. We can discuss your risk at 40 & determine whether to get mammogram sooner  Of course, I can perform this sooner if there is family history of cancer, or if you have problems or questions    ==============================  RECOMMENDATIONS FOR FEMALES  ==============================  Your #1 MEDICINE is DAILY EXERCISE - 15-20 minutes of huffing & puffing EVERY DAY.     Prevent the #1 cause of death- cardiovascular disease (HEART ATTACK & STROKE) by checking for normal blood pressure, cholesterol, sugars, & by not smoking.     VACCINES: Yearly FLU shot, PNEUMONIA shot after 65,  SHINGLES shot after 50    Screening colonoscopy at AGE  50 & every 10 years to check for COLON CANCER,  one of the most common & preventable cancers (Or FIT kit yearly) Repeat in 3 years if POLYP found     I recommend  high fiber (5 fresh fruits or vegetables daily), low fat diet and aerobic  exercise (huffing/ puffing/ sweating for 20 min straight at least 4 days a week)

## 2023-04-21 ENCOUNTER — PATIENT MESSAGE (OUTPATIENT)
Dept: ADMINISTRATIVE | Facility: HOSPITAL | Age: 63
End: 2023-04-21
Payer: MEDICARE

## 2023-05-30 ENCOUNTER — TELEPHONE (OUTPATIENT)
Dept: PRIMARY CARE CLINIC | Facility: CLINIC | Age: 63
End: 2023-05-30
Payer: MEDICARE

## 2023-05-30 NOTE — TELEPHONE ENCOUNTER
----- Message from Lorelei Wray sent at 5/30/2023  8:53 AM CDT -----  Contact: Self/[phone]  type: Lab    Caller is requesting to schedule their Lab appointment prior to annual appointment.  Order is not listed in EPIC.  Please enter order and contact patient to schedule.    Name of Caller:Shara Meadows Date and Time of Labs: 6/13    Date of Annual Physical Appointment: 6/20    Where would they like the lab performed?[facility]     Would the patient rather a call back or a response via [facility]? Call back     Best Call Back Number:[phone]

## 2023-05-30 NOTE — TELEPHONE ENCOUNTER
LVM Your annual exam is with Dr Montoya. Dr Montoya does not do annual labs before the visit. Can come to appt fasting and labs will be ordered during the visit. Call office with any questions.

## 2023-06-01 ENCOUNTER — PES CALL (OUTPATIENT)
Dept: ADMINISTRATIVE | Facility: CLINIC | Age: 63
End: 2023-06-01
Payer: MEDICARE

## 2023-06-06 ENCOUNTER — PATIENT OUTREACH (OUTPATIENT)
Dept: ADMINISTRATIVE | Facility: HOSPITAL | Age: 63
End: 2023-06-06
Payer: MEDICARE

## 2023-06-06 NOTE — PROGRESS NOTES
Patient due for the following    Colorectal Cancer Screening     Shingles Vaccine (1 of 2)    Cervical Cancer Screening     Mammogram     COVID-19 Vaccine (4 - Moderna series)    Hemoglobin A1c (Prediabetes)       Immunizations: reviewed and updated  Care Everywhere: triggered  Care Teams: up to date  Outreach: none needed-patient est care with new provider

## 2023-06-20 ENCOUNTER — LAB VISIT (OUTPATIENT)
Dept: LAB | Facility: HOSPITAL | Age: 63
End: 2023-06-20
Attending: INTERNAL MEDICINE
Payer: MEDICARE

## 2023-06-20 ENCOUNTER — OFFICE VISIT (OUTPATIENT)
Dept: PRIMARY CARE CLINIC | Facility: CLINIC | Age: 63
End: 2023-06-20
Payer: MEDICARE

## 2023-06-20 VITALS
BODY MASS INDEX: 24.94 KG/M2 | WEIGHT: 158.94 LBS | RESPIRATION RATE: 18 BRPM | TEMPERATURE: 98 F | OXYGEN SATURATION: 99 % | HEART RATE: 67 BPM | SYSTOLIC BLOOD PRESSURE: 128 MMHG | HEIGHT: 67 IN | DIASTOLIC BLOOD PRESSURE: 70 MMHG

## 2023-06-20 DIAGNOSIS — Z12.11 SCREENING FOR COLORECTAL CANCER: ICD-10-CM

## 2023-06-20 DIAGNOSIS — Z12.12 SCREENING FOR COLORECTAL CANCER: ICD-10-CM

## 2023-06-20 DIAGNOSIS — R79.9 ABNORMAL FINDING OF BLOOD CHEMISTRY, UNSPECIFIED: ICD-10-CM

## 2023-06-20 DIAGNOSIS — R73.03 PREDIABETES: ICD-10-CM

## 2023-06-20 DIAGNOSIS — E78.00 HYPERCHOLESTEROLEMIA: ICD-10-CM

## 2023-06-20 DIAGNOSIS — F41.1 GAD (GENERALIZED ANXIETY DISORDER): ICD-10-CM

## 2023-06-20 DIAGNOSIS — Z12.31 OTHER SCREENING MAMMOGRAM: ICD-10-CM

## 2023-06-20 DIAGNOSIS — I10 ESSENTIAL HYPERTENSION: ICD-10-CM

## 2023-06-20 DIAGNOSIS — E89.0 POSTABLATIVE HYPOTHYROIDISM: ICD-10-CM

## 2023-06-20 DIAGNOSIS — Z00.00 ROUTINE GENERAL MEDICAL EXAMINATION AT A HEALTH CARE FACILITY: ICD-10-CM

## 2023-06-20 DIAGNOSIS — Z00.00 ROUTINE GENERAL MEDICAL EXAMINATION AT A HEALTH CARE FACILITY: Primary | ICD-10-CM

## 2023-06-20 DIAGNOSIS — F33.41 RECURRENT MAJOR DEPRESSIVE DISORDER, IN PARTIAL REMISSION: ICD-10-CM

## 2023-06-20 LAB
ALBUMIN SERPL BCP-MCNC: 4 G/DL (ref 3.5–5.2)
ALP SERPL-CCNC: 76 U/L (ref 55–135)
ALT SERPL W/O P-5'-P-CCNC: 19 U/L (ref 10–44)
ANION GAP SERPL CALC-SCNC: 10 MMOL/L (ref 8–16)
AST SERPL-CCNC: 38 U/L (ref 10–40)
BASOPHILS # BLD AUTO: 0.02 K/UL (ref 0–0.2)
BASOPHILS NFR BLD: 0.3 % (ref 0–1.9)
BILIRUB SERPL-MCNC: 0.5 MG/DL (ref 0.1–1)
BUN SERPL-MCNC: 12 MG/DL (ref 8–23)
CALCIUM SERPL-MCNC: 9.7 MG/DL (ref 8.7–10.5)
CHLORIDE SERPL-SCNC: 106 MMOL/L (ref 95–110)
CHOLEST SERPL-MCNC: 208 MG/DL (ref 120–199)
CHOLEST/HDLC SERPL: 3.2 {RATIO} (ref 2–5)
CO2 SERPL-SCNC: 25 MMOL/L (ref 23–29)
CREAT SERPL-MCNC: 0.8 MG/DL (ref 0.5–1.4)
DIFFERENTIAL METHOD: ABNORMAL
EOSINOPHIL # BLD AUTO: 0.1 K/UL (ref 0–0.5)
EOSINOPHIL NFR BLD: 1.3 % (ref 0–8)
ERYTHROCYTE [DISTWIDTH] IN BLOOD BY AUTOMATED COUNT: 18.1 % (ref 11.5–14.5)
EST. GFR  (NO RACE VARIABLE): >60 ML/MIN/1.73 M^2
ESTIMATED AVG GLUCOSE: 114 MG/DL (ref 68–131)
GLUCOSE SERPL-MCNC: 92 MG/DL (ref 70–110)
HBA1C MFR BLD: 5.6 % (ref 4–5.6)
HCT VFR BLD AUTO: 38.1 % (ref 37–48.5)
HDLC SERPL-MCNC: 66 MG/DL (ref 40–75)
HDLC SERPL: 31.7 % (ref 20–50)
HGB BLD-MCNC: 11.7 G/DL (ref 12–16)
IMM GRANULOCYTES # BLD AUTO: 0.01 K/UL (ref 0–0.04)
IMM GRANULOCYTES NFR BLD AUTO: 0.2 % (ref 0–0.5)
LDLC SERPL CALC-MCNC: 122 MG/DL (ref 63–159)
LYMPHOCYTES # BLD AUTO: 2.4 K/UL (ref 1–4.8)
LYMPHOCYTES NFR BLD: 37.9 % (ref 18–48)
MCH RBC QN AUTO: 21.8 PG (ref 27–31)
MCHC RBC AUTO-ENTMCNC: 30.7 G/DL (ref 32–36)
MCV RBC AUTO: 71 FL (ref 82–98)
MONOCYTES # BLD AUTO: 0.4 K/UL (ref 0.3–1)
MONOCYTES NFR BLD: 6 % (ref 4–15)
NEUTROPHILS # BLD AUTO: 3.4 K/UL (ref 1.8–7.7)
NEUTROPHILS NFR BLD: 54.3 % (ref 38–73)
NONHDLC SERPL-MCNC: 142 MG/DL
NRBC BLD-RTO: 0 /100 WBC
PLATELET # BLD AUTO: 178 K/UL (ref 150–450)
PMV BLD AUTO: 12.5 FL (ref 9.2–12.9)
POTASSIUM SERPL-SCNC: 3.4 MMOL/L (ref 3.5–5.1)
PROT SERPL-MCNC: 7.4 G/DL (ref 6–8.4)
RBC # BLD AUTO: 5.37 M/UL (ref 4–5.4)
SODIUM SERPL-SCNC: 141 MMOL/L (ref 136–145)
TRIGL SERPL-MCNC: 100 MG/DL (ref 30–150)
TSH SERPL DL<=0.005 MIU/L-ACNC: 0.73 UIU/ML (ref 0.4–4)
WBC # BLD AUTO: 6.33 K/UL (ref 3.9–12.7)

## 2023-06-20 PROCEDURE — 85025 COMPLETE CBC W/AUTO DIFF WBC: CPT | Performed by: INTERNAL MEDICINE

## 2023-06-20 PROCEDURE — 83036 HEMOGLOBIN GLYCOSYLATED A1C: CPT | Performed by: INTERNAL MEDICINE

## 2023-06-20 PROCEDURE — 99396 PREV VISIT EST AGE 40-64: CPT | Mod: S$PBB,GZ,, | Performed by: INTERNAL MEDICINE

## 2023-06-20 PROCEDURE — 84443 ASSAY THYROID STIM HORMONE: CPT | Performed by: INTERNAL MEDICINE

## 2023-06-20 PROCEDURE — 99396 PR PREVENTIVE VISIT,EST,40-64: ICD-10-PCS | Mod: S$PBB,GZ,, | Performed by: INTERNAL MEDICINE

## 2023-06-20 PROCEDURE — 36415 COLL VENOUS BLD VENIPUNCTURE: CPT | Mod: PN | Performed by: INTERNAL MEDICINE

## 2023-06-20 PROCEDURE — 99999 PR PBB SHADOW E&M-EST. PATIENT-LVL V: ICD-10-PCS | Mod: PBBFAC,,, | Performed by: INTERNAL MEDICINE

## 2023-06-20 PROCEDURE — 99999 PR PBB SHADOW E&M-EST. PATIENT-LVL V: CPT | Mod: PBBFAC,,, | Performed by: INTERNAL MEDICINE

## 2023-06-20 PROCEDURE — 80061 LIPID PANEL: CPT | Performed by: INTERNAL MEDICINE

## 2023-06-20 PROCEDURE — 80053 COMPREHEN METABOLIC PANEL: CPT | Performed by: INTERNAL MEDICINE

## 2023-06-20 RX ORDER — LOSARTAN POTASSIUM 25 MG/1
25 TABLET ORAL DAILY
Qty: 90 TABLET | Refills: 3 | Status: SHIPPED | OUTPATIENT
Start: 2023-06-20 | End: 2024-06-19

## 2023-06-20 RX ORDER — LEVOTHYROXINE SODIUM 112 UG/1
112 TABLET ORAL EVERY MORNING
Qty: 90 TABLET | Refills: 3 | Status: SHIPPED | OUTPATIENT
Start: 2023-06-20

## 2023-06-20 RX ORDER — ATORVASTATIN CALCIUM 20 MG/1
20 TABLET, FILM COATED ORAL DAILY
Qty: 90 TABLET | Refills: 3 | Status: SHIPPED | OUTPATIENT
Start: 2023-06-20 | End: 2024-06-19

## 2023-06-20 NOTE — PROGRESS NOTES
Subjective:      Patient ID: Shara Combs is a 62 y.o. female.    Chief Complaint: Annual Exam      Shara Combs is a 62 y.o. female with PMH significant for hypothyroidism, prediabetes, HLD, HTN, MDD, DAYAN (managed by psychiatry)   Presenting today for follow up / annual. Date of last annual is 6/7/2022    Anxiety: Follows with psychiatrist for anxiety. She has been going through a lot. She reports that her girlfriend who was diagnosed with breast cancer and underwent mastectomy doing well. Discussed ways to manage stress during visit. She declines therapy for now.     Post ablated hypothyroidism: Hx of hyperthyroidism s/p ablation. Continue to take levothyroxine 112mcg. No weight gain/weight loss. Check TSH this visit.     HLD: Lipid panel ordered this year. Continue current medication regimen     HTN: Office BP well controlled 128/70. No CP/SOB/lightheadedness/dizziness/palpitations. Continue losartan.    Prediabetes: Last A1c at 6.0%. Recheck today. Discussed healthy diet and continuing to exercise.     Denies any chest pain, shortness of breath, nausea vomiting constipation diarrhea, blood in stool, heartburn     Mammogram - 9/2019, ordered today  Colonoscopy - ordered today  Pap smear - gynecology referral placed.     Review of Systems   Constitutional:  Negative for chills, fever and weight loss.   HENT:  Negative for congestion, ear pain and sore throat.    Eyes:  Negative for double vision.   Respiratory:  Negative for cough and shortness of breath.    Cardiovascular:  Negative for chest pain, palpitations and leg swelling.   Gastrointestinal:  Negative for abdominal pain, heartburn, nausea and vomiting.   Skin:  Negative for rash.   Neurological:  Negative for dizziness, tingling and headaches.   Psychiatric/Behavioral:  Negative for depression.         Current Outpatient Medications:     doxepin (SINEQUAN) 100 MG capsule, Take 1 capsule (100 mg total) by mouth every evening.,  Disp: 90 capsule, Rfl: 3    lorazepam (ATIVAN) 0.5 MG tablet, Take 0.5 mg by mouth 2 (two) times daily., Disp: , Rfl: 1    quetiapine (SEROQUEL) 100 MG Tab, Take 100 mg by mouth nightly. at bedtime., Disp: , Rfl: 1    sertraline (ZOLOFT) 100 MG tablet, Take 100 mg by mouth 2 (two) times daily., Disp: , Rfl:     trazodone (DESYREL) 150 MG tablet, Take 150 mg by mouth nightly. at bedtime., Disp: , Rfl: 1    atorvastatin (LIPITOR) 20 MG tablet, Take 1 tablet (20 mg total) by mouth once daily., Disp: 90 tablet, Rfl: 3    levothyroxine (SYNTHROID) 112 MCG tablet, Take 1 tablet (112 mcg total) by mouth every morning., Disp: 90 tablet, Rfl: 3    losartan (COZAAR) 25 MG tablet, Take 1 tablet (25 mg total) by mouth once daily., Disp: 90 tablet, Rfl: 3    sertraline (ZOLOFT) 25 MG tablet, Take 25 mg by mouth once daily., Disp: , Rfl: 0    Lab Results   Component Value Date    HGBA1C 6.0 (H) 06/07/2022    HGBA1C 5.7 (H) 07/06/2021    HGBA1C 5.6 08/11/2020     Lab Results   Component Value Date    MICALBCREAT 3.9 09/03/2019     Lab Results   Component Value Date    LDLCALC 119.6 06/07/2022    LDLCALC 113.8 07/06/2021    CHOL 191 06/07/2022    HDL 55 06/07/2022    TRIG 82 06/07/2022       Lab Results   Component Value Date     06/07/2022    K 3.9 06/07/2022     06/07/2022    CO2 28 06/07/2022     06/07/2022    BUN 10 06/07/2022    CREATININE 0.8 06/07/2022    CALCIUM 9.4 06/07/2022    PROT 7.5 06/07/2022    ALBUMIN 4.0 06/07/2022    BILITOT 0.5 06/07/2022    ALKPHOS 75 06/07/2022    AST 34 06/07/2022    ALT 20 06/07/2022    ANIONGAP 6 (L) 06/07/2022    ESTGFRAFRICA >60.0 06/07/2022    EGFRNONAA >60.0 06/07/2022    WBC 6.65 06/07/2022    HGB 11.8 (L) 06/07/2022    HGB 11.1 (L) 07/06/2021    HCT 39.3 06/07/2022    MCV 72 (L) 06/07/2022     06/07/2022    TSH 0.998 06/07/2022    HEPCAB Negative 08/31/2004       Lab Results   Component Value Date    FSH 3.7 08/31/2004    FERRITIN 72.7 09/27/2007    IRON 103  "2007    TIBC 343 2007    FESATURATED 30 2007         Past Medical History:   Diagnosis Date    Anxiety 2016    psychiatrist  Mike Martin   [facility] [address], & seeing therapist    Elevated glucose 2017    Grief 2020    Sister passed     Hypothyroidism     after tx with iodine     Past Surgical History:   Procedure Laterality Date     SECTION, CLASSIC       Social History     Social History Narrative                 Family History   Problem Relation Age of Onset    Hypertension Father     Stroke Father     Thyroid disease Sister      Vitals:    23 1028   BP: 128/70   Pulse: 67   Resp: 18   Temp: 98 °F (36.7 °C)   SpO2: 99%   Weight: 72.1 kg (158 lb 15.2 oz)   Height: 5' 6.5" (1.689 m)   PainSc: 0-No pain     Objective:   Physical Exam  Vitals reviewed.   Constitutional:       Appearance: Normal appearance.   HENT:      Head: Normocephalic.      Right Ear: Tympanic membrane, ear canal and external ear normal.      Left Ear: Tympanic membrane, ear canal and external ear normal.      Nose: Nose normal.      Mouth/Throat:      Mouth: Mucous membranes are moist.      Pharynx: Oropharynx is clear.   Eyes:      Conjunctiva/sclera: Conjunctivae normal.      Pupils: Pupils are equal, round, and reactive to light.   Cardiovascular:      Rate and Rhythm: Normal rate and regular rhythm.      Pulses: Normal pulses.   Pulmonary:      Effort: Pulmonary effort is normal.      Breath sounds: Normal breath sounds.   Abdominal:      General: Abdomen is flat. Bowel sounds are normal.      Palpations: Abdomen is soft.   Musculoskeletal:      Cervical back: Neck supple.   Skin:     General: Skin is warm.   Neurological:      General: No focal deficit present.      Mental Status: She is alert.   Psychiatric:         Mood and Affect: Mood normal.     Assessment/Plan     Shara Combs is a 62 y.o.female with:    Routine general medical examination at a [facility]" care facility  -     Hemoglobin A1C; Future; Expected date: 06/20/2023  -     CBC Auto Differential; Future; Expected date: 06/20/2023  -     Comprehensive Metabolic Panel; Future; Expected date: 06/20/2023  -     TSH; Future; Expected date: 06/20/2023  -     Mammo Digital Screening Bilat; Future; Expected date: 06/20/2023  -     Lipid Panel; Future; Expected date: 06/20/2023  -     Ambulatory referral/consult to Gynecology; Future; Expected date: 06/27/2023    Recurrent major depressive disorder, in partial remission    Postablative hypothyroidism  -     Comprehensive Metabolic Panel; Future; Expected date: 06/20/2023  -     TSH; Future; Expected date: 06/20/2023    DAYAN (generalized anxiety disorder)  -     CBC Auto Differential; Future; Expected date: 06/20/2023  -     Comprehensive Metabolic Panel; Future; Expected date: 06/20/2023  -     TSH; Future; Expected date: 06/20/2023    Prediabetes  -     Hemoglobin A1C; Future; Expected date: 06/20/2023  -     Comprehensive Metabolic Panel; Future; Expected date: 06/20/2023  -     Lipid Panel; Future; Expected date: 06/20/2023    Other screening mammogram  -     Mammo Digital Screening Bilat; Future; Expected date: 06/20/2023    Screening for colorectal cancer  -     Ambulatory referral/consult to Endo Procedure ; Future; Expected date: 06/21/2023    Hypercholesterolemia  -     atorvastatin (LIPITOR) 20 MG tablet; Take 1 tablet (20 mg total) by mouth once daily.  Dispense: 90 tablet; Refill: 3  -     levothyroxine (SYNTHROID) 112 MCG tablet; Take 1 tablet (112 mcg total) by mouth every morning.  Dispense: 90 tablet; Refill: 3  -     losartan (COZAAR) 25 MG tablet; Take 1 tablet (25 mg total) by mouth once daily.  Dispense: 90 tablet; Refill: 3    Essential hypertension  -     atorvastatin (LIPITOR) 20 MG tablet; Take 1 tablet (20 mg total) by mouth once daily.  Dispense: 90 tablet; Refill: 3  -     levothyroxine (SYNTHROID) 112 MCG tablet; Take 1 tablet (112  mcg total) by mouth every morning.  Dispense: 90 tablet; Refill: 3  -     losartan (COZAAR) 25 MG tablet; Take 1 tablet (25 mg total) by mouth once daily.  Dispense: 90 tablet; Refill: 3    Abnormal finding of blood chemistry, unspecified  -     CBC Auto Differential; Future; Expected date: 06/20/2023  -     Lipid Panel; Future; Expected date: 06/20/2023         Chronic conditions status updated as per HPI.  Other than changes above, cont current medications and maintain follow up with specialists.  Return to clinic in Follow up in about 1 year (around 6/20/2024).      Coral Montoya MD  [facility]    Patient Instructions   Labs are fasting. Please do not eat or drink anything other than water for 8-10 hrs prior to your lab work.    12 months for well visit or sooner if needed.   Tests to Keep You Healthy    Mammogram: ORDERED BUT NOT SCHEDULED  Colon Cancer Screening: DUE  Cervical Cancer Screening: DUE  Last Blood Pressure <= 139/89 (6/20/2023): NO

## 2023-06-21 DIAGNOSIS — E87.6 HYPOKALEMIA: Primary | ICD-10-CM

## 2023-06-21 NOTE — PROGRESS NOTES
Your blood count (CBC) is stable.    Your sugar number (Glucose) is within normal limits.  Your A1c is 5.6% - very good.   Your potassium is mildly low. I am attaching a list of food that are high in potassium. Let us recheck your level in 4 weeks.   Rest of your electrolytes are unremarkable.    Your kidney (BUN, Creatinine and GFT) function is unremarkable.   Your liver (AST, ALT) function is unremarkable.  These are the filters in your body for medicine, food and liquids that you ingest.    Your Cholesterol is mildly elevated. Continue to focus on low fat, high fiber foods and aerobic exericse (huffing & puffing) for at least 20 minutes most days of the week.    Your Thyroid numbers are normal.    List of High-Potassium Foods:  Most foods have potassium. Try to eat a half-cup a day of these high-potassium foods to keep your levels up:    High-potassium fruits:  Apricots  Bananas  Cantaloupe  Dried fruit  Honeydew melon  Kiwi  [place]  Nectarines  Oranges and orange juice  Papaya  Pomegranate and pomegranate juice  Prunes and prune juice  Pumpkin  Raisins      High-potassium vegetables:  [place] squash, butternut squash, [place] squash  Avocado  Artichoke  Beets  Baked beans, black beans, refried beans  Broccoli (cooked)  [place] sprouts  Kohlrabi  Lentils  Okra  Onions (fried)  Parsnips  Potatoes (white and sweet)  [place]  Spinach (cooked)  Tomatoes, tomato sauce, and tomato paste  Vegetable juice      Other high-potassium foods:  Bran products  Chocolate  Coconut  Creamed soups  French fries  Granola  Ice cream  Milk (buttermilk, chocolate, eggnog evaporated, malted, soy and milkshakes)  Miso  Molasses  Nuts  Peanut butter  Potato chips  Salt substitutes  Seeds  Tofu  Yogurt

## 2024-01-03 ENCOUNTER — TELEPHONE (OUTPATIENT)
Dept: PRIMARY CARE CLINIC | Facility: CLINIC | Age: 64
End: 2024-01-03
Payer: MEDICARE

## 2024-01-03 NOTE — TELEPHONE ENCOUNTER
----- Message from Anali Fitch sent at 1/3/2024  9:13 AM CST -----  Contact: [phone]  Pt is changing over from Dr Montoya since she is moving to a different clinic. Do you include a pap smear in the annual appt or would she need to see gyn? Please call pt to let her know. Thanks

## 2024-01-03 NOTE — TELEPHONE ENCOUNTER
Called patient no answer. Called pt to informed her that Dr. Costello does not do Pap Smears. Will try to contact pt again.

## 2024-01-11 DIAGNOSIS — Z00.00 ENCOUNTER FOR MEDICARE ANNUAL WELLNESS EXAM: ICD-10-CM

## 2024-02-16 ENCOUNTER — PATIENT MESSAGE (OUTPATIENT)
Dept: INTERNAL MEDICINE | Facility: CLINIC | Age: 64
End: 2024-02-16
Payer: MEDICARE

## 2024-04-30 ENCOUNTER — OFFICE VISIT (OUTPATIENT)
Dept: PRIMARY CARE CLINIC | Facility: CLINIC | Age: 64
End: 2024-04-30
Payer: MEDICARE

## 2024-04-30 VITALS
OXYGEN SATURATION: 99 % | WEIGHT: 142.88 LBS | DIASTOLIC BLOOD PRESSURE: 76 MMHG | SYSTOLIC BLOOD PRESSURE: 125 MMHG | BODY MASS INDEX: 22.96 KG/M2 | HEART RATE: 57 BPM | HEIGHT: 66 IN

## 2024-04-30 DIAGNOSIS — E03.9 HYPOTHYROIDISM, UNSPECIFIED TYPE: ICD-10-CM

## 2024-04-30 DIAGNOSIS — Z12.31 ENCOUNTER FOR SCREENING MAMMOGRAM FOR MALIGNANT NEOPLASM OF BREAST: ICD-10-CM

## 2024-04-30 DIAGNOSIS — I10 ESSENTIAL HYPERTENSION: Primary | ICD-10-CM

## 2024-04-30 DIAGNOSIS — Z12.11 SCREENING FOR COLON CANCER: ICD-10-CM

## 2024-04-30 DIAGNOSIS — Z13.6 ENCOUNTER FOR SCREENING FOR CARDIOVASCULAR DISORDERS: ICD-10-CM

## 2024-04-30 DIAGNOSIS — Z12.4 SCREENING FOR CERVICAL CANCER: ICD-10-CM

## 2024-04-30 DIAGNOSIS — E89.0 POSTABLATIVE HYPOTHYROIDISM: ICD-10-CM

## 2024-04-30 DIAGNOSIS — E78.2 MIXED HYPERLIPIDEMIA: ICD-10-CM

## 2024-04-30 DIAGNOSIS — Z86.39 HISTORY OF HYPOTHYROIDISM: ICD-10-CM

## 2024-04-30 DIAGNOSIS — Z12.39 ENCOUNTER FOR SCREENING FOR MALIGNANT NEOPLASM OF BREAST, UNSPECIFIED SCREENING MODALITY: ICD-10-CM

## 2024-04-30 DIAGNOSIS — F41.9 ANXIETY: ICD-10-CM

## 2024-04-30 DIAGNOSIS — F33.2 MAJOR DEPRESSIVE DISORDER, RECURRENT SEVERE WITHOUT PSYCHOTIC FEATURES: ICD-10-CM

## 2024-04-30 DIAGNOSIS — Z00.00 ANNUAL PHYSICAL EXAM: ICD-10-CM

## 2024-04-30 PROCEDURE — 99999 PR PBB SHADOW E&M-EST. PATIENT-LVL IV: CPT | Mod: PBBFAC,,, | Performed by: STUDENT IN AN ORGANIZED HEALTH CARE EDUCATION/TRAINING PROGRAM

## 2024-04-30 PROCEDURE — 99214 OFFICE O/P EST MOD 30 MIN: CPT | Mod: S$PBB,,, | Performed by: STUDENT IN AN ORGANIZED HEALTH CARE EDUCATION/TRAINING PROGRAM

## 2024-04-30 RX ORDER — LOSARTAN POTASSIUM 25 MG/1
25 TABLET ORAL DAILY
Qty: 90 TABLET | Refills: 3 | Status: SHIPPED | OUTPATIENT
Start: 2024-04-30 | End: 2025-04-30

## 2024-04-30 RX ORDER — LEVOTHYROXINE SODIUM 112 UG/1
112 TABLET ORAL EVERY MORNING
Qty: 90 TABLET | Refills: 3 | Status: SHIPPED | OUTPATIENT
Start: 2024-04-30 | End: 2024-06-12

## 2024-04-30 RX ORDER — ATORVASTATIN CALCIUM 20 MG/1
20 TABLET, FILM COATED ORAL DAILY
Qty: 90 TABLET | Refills: 3 | Status: SHIPPED | OUTPATIENT
Start: 2024-04-30 | End: 2025-04-30

## 2024-04-30 NOTE — PROGRESS NOTES
Office visit  Patient: Shara Combs   4/30/2024     Assessment:     1. Major depressive disorder, recurrent severe without psychotic features    2. Essential hypertension    3. Anxiety    4. Postablative hypothyroidism    5. Annual physical exam    6. Mixed hyperlipidemia    7. Screening for cervical cancer    8. Screening for colon cancer    9. Encounter for screening for malignant neoplasm of breast, unspecified screening modality      Plan:            1. Major depressive disorder, recurrent severe without psychotic features        -stable, continue sertraline, quetiapine, doxepin, trazodone         -continue following with psychiatry    2. Essential hypertension  -     losartan (COZAAR) 25 MG tablet; Take 1 tablet (25 mg total) by mouth once daily.  Dispense: 90 tablet; Refill: 3        -stable, continue current medication    3. Anxiety        -stable, continue prn lorazepam per psychiatry    4. Postablative hypothyroidism       -continue levothyroxine    5. Annual physical exam  -     TSH; Future; Expected date: 04/30/2024  -     Hemoglobin A1C; Future; Expected date: 04/30/2024  -     Lipid Panel; Future; Expected date: 04/30/2024  -     Comprehensive Metabolic Panel; Future; Expected date: 04/30/2024  -     CBC Auto Differential; Future; Expected date: 04/30/2024        -patient counseled to get her labs done in June, as that was when her last physical was    6. Mixed hyperlipidemia  -     atorvastatin (LIPITOR) 20 MG tablet; Take 1 tablet (20 mg total) by mouth once daily.  Dispense: 90 tablet; Refill: 3    7. Screening for cervical cancer  -     Ambulatory referral/consult to Obstetrics / Gynecology; Future; Expected date: 05/07/2024    8. Screening for colon cancer  -     Fecal Immunochemical Test (iFOBT); Future; Expected date: 04/30/2024    9. Encounter for screening for malignant neoplasm of breast, unspecified screening modality  -     Mammo Digital Screening Bilat w/ [place]; Future; Expected date:  04/30/2024    Return to clinic in 6 months or sooner as needed.      CHIEF COMPLAINT: establish care    HPI: Shara Combs is a 63 y.o. female who presents to establish care. She reports her anxiety is flaring up because she just found out her  was diagnosed with lung cancer.    She sees a psychiatrist, Mike Martin.    Review of Systems   Constitutional:  Negative for fever and malaise/fatigue.   HENT:  Negative for congestion, ear discharge, ear pain and sore throat.    Eyes:  Negative for pain and discharge.   Respiratory:  Negative for cough and shortness of breath.    Cardiovascular:  Negative for chest pain and palpitations.   Gastrointestinal:  Negative for abdominal pain, constipation, diarrhea, nausea and vomiting.   Genitourinary:  Negative for dysuria, frequency and hematuria.   Musculoskeletal:  Negative for joint pain and myalgias.   Skin:  Negative for rash.   Neurological:  Negative for dizziness, seizures and headaches.         Current Outpatient Medications   Medication Instructions    atorvastatin (LIPITOR) 20 mg, Oral, Daily    doxepin (SINEQUAN) 100 mg, Oral, Nightly    levothyroxine (SYNTHROID) 112 mcg, Oral, Every morning    LORazepam (ATIVAN) 0.5 mg, Oral, 2 times daily    losartan (COZAAR) 25 mg, Oral, Daily    QUEtiapine (SEROQUEL) 100 mg, Oral, Nightly, at bedtime.     sertraline (ZOLOFT) 25 mg, Oral, Daily    sertraline (ZOLOFT) 100 mg, Oral, 2 times daily    traZODone (DESYREL) 150 mg, Oral, Nightly, at bedtime.        Lab Results   Component Value Date    HGBA1C 5.6 06/20/2023    HGBA1C 6.0 (H) 06/07/2022    HGBA1C 5.7 (H) 07/06/2021     Lab Results   Component Value Date    MICALBCREAT 3.9 09/03/2019     Lab Results   Component Value Date    LDLCALC 122.0 06/20/2023    LDLCALC 119.6 06/07/2022    CHOL 208 (H) 06/20/2023    HDL 66 06/20/2023    TRIG 100 06/20/2023       Lab Results   Component Value Date     06/20/2023    K 3.4 (L) 06/20/2023      "2023    CO2 25 2023    GLU 92 2023    BUN 12 2023    CREATININE 0.8 2023    CALCIUM 9.7 2023    PROT 7.4 2023    ALBUMIN 4.0 2023    BILITOT 0.5 2023    ALKPHOS 76 2023    AST 38 2023    ALT 19 2023    ANIONGAP 10 2023    ESTGFRAFRICA >60.0 2022    EGFRNONAA >60.0 2022    WBC 6.33 2023    HGB 11.7 (L) 2023    HGB 11.8 (L) 2022    HCT 38.1 2023    MCV 71 (L) 2023     2023    TSH 0.734 2023    HEPCAB Negative 2004       Lab Results   Component Value Date    FSH 3.7 2004    FERRITIN 72.7 2007    IRON 103 2007    TIBC 343 2007    FESATURATED 30 2007         Past Medical History:   Diagnosis Date    Anxiety 2016    psychiatrist  Mike Martin   [address] [place] [address], & seeing therapist    Elevated glucose 2017    Grief 2020    Sister passed     Hypothyroidism     after tx with iodine     Past Surgical History:   Procedure Laterality Date     SECTION, CLASSIC       Vitals:    24 1125   BP: 125/76   Pulse: (!) 57   SpO2: 99%   Weight: 64.8 kg (142 lb 13.7 oz)   Height: 5' 6" (1.676 m)   PainSc: 0-No pain     Objective:   Physical Exam  Vitals reviewed.   Constitutional:       General: She is not in acute distress.     Appearance: Normal appearance. She is well-developed.   HENT:      Head: Normocephalic and atraumatic.      Right Ear: External ear normal.      Left Ear: External ear normal.      Nose: Nose normal.      Mouth/Throat:      Mouth: Mucous membranes are moist.   Eyes:      General: No scleral icterus.        Right eye: No discharge.         Left eye: No discharge.      Conjunctiva/sclera: Conjunctivae normal.   Cardiovascular:      Rate and Rhythm: Normal rate and regular rhythm.      Heart sounds: Normal heart sounds. No murmur heard.     No friction rub. No gallop.   Pulmonary:      Effort: " Pulmonary effort is normal. No respiratory distress.      Breath sounds: Normal breath sounds. No wheezing, rhonchi or rales.   Musculoskeletal:         General: No deformity.      Right lower leg: No edema.      Left lower leg: No edema.   Skin:     General: Skin is warm and dry.   Neurological:      General: No focal deficit present.      Mental Status: She is alert and oriented to person, place, and time.   Psychiatric:         Mood and Affect: Mood normal.         Behavior: Behavior normal.         Thought Content: Thought content normal.             Honey Costello MD  Internal Medicine and Pediatrics

## 2024-05-01 ENCOUNTER — OFFICE VISIT (OUTPATIENT)
Dept: OBSTETRICS AND GYNECOLOGY | Facility: CLINIC | Age: 64
End: 2024-05-01
Payer: MEDICARE

## 2024-05-01 VITALS
DIASTOLIC BLOOD PRESSURE: 78 MMHG | BODY MASS INDEX: 23.31 KG/M2 | WEIGHT: 144.38 LBS | SYSTOLIC BLOOD PRESSURE: 122 MMHG

## 2024-05-01 DIAGNOSIS — Z01.419 WOMEN'S ANNUAL ROUTINE GYNECOLOGICAL EXAMINATION: Primary | ICD-10-CM

## 2024-05-01 DIAGNOSIS — Z12.4 SCREENING FOR CERVICAL CANCER: ICD-10-CM

## 2024-05-01 DIAGNOSIS — Z11.51 SCREENING FOR HPV (HUMAN PAPILLOMAVIRUS): ICD-10-CM

## 2024-05-01 DIAGNOSIS — N95.1 MENOPAUSAL STATE: ICD-10-CM

## 2024-05-01 PROCEDURE — 99213 OFFICE O/P EST LOW 20 MIN: CPT | Mod: PBBFAC | Performed by: NURSE PRACTITIONER

## 2024-05-01 PROCEDURE — 88175 CYTOPATH C/V AUTO FLUID REDO: CPT | Performed by: NURSE PRACTITIONER

## 2024-05-01 PROCEDURE — 99999 PR PBB SHADOW E&M-EST. PATIENT-LVL III: CPT | Mod: PBBFAC,,, | Performed by: NURSE PRACTITIONER

## 2024-05-01 PROCEDURE — 87624 HPV HI-RISK TYP POOLED RSLT: CPT | Performed by: NURSE PRACTITIONER

## 2024-05-01 PROCEDURE — G0101 CA SCREEN;PELVIC/BREAST EXAM: HCPCS | Mod: S$PBB,,, | Performed by: NURSE PRACTITIONER

## 2024-05-01 PROCEDURE — G0101 CA SCREEN;PELVIC/BREAST EXAM: HCPCS | Mod: PBBFAC | Performed by: NURSE PRACTITIONER

## 2024-05-01 NOTE — PROGRESS NOTES
CC: Annual  HPI: Pt is a 63 y.o.  female who presents for routine annual exam. She is , some symptoms but manageable. Denies any GYN complaints.  The patient participates in regular exercise: Yes.  The patient does not smoke.  The patient wears seatbelts.   Pt denies any domestic violence.    Last pap smear in 2017 with normal result.  recently diagnosed with colon cancer, doing very well with injections. Happily , she is retired. Active, exercising several times weekly.     No current use of Vitamin D supplement.      FH:  Breast cancer: none  Colon cancer: none  Ovarian cancer: none  Endometrial cancer: none    ROS:  GENERAL: Feeling well overall. Denies fever or chills.   SKIN: Denies rash or lesions.   HEAD: Denies head injury or headache.   NODES: Denies enlarged lymph nodes.   CHEST: Denies chest pain or shortness of breath.   CARDIOVASCULAR: Denies palpitations or left sided chest pain.   ABDOMEN: No abdominal pain, constipation, diarrhea, nausea, vomiting or rectal bleeding.   URINARY: No dysuria, hematuria, or burning on urination.  REPRODUCTIVE: See HPI.   BREASTS: Denies pain, lumps, or nipple discharge.   HEMATOLOGIC: No easy bruisability or excessive bleeding.   MUSCULOSKELETAL: Denies joint pain or swelling.   NEUROLOGIC: Denies syncope or weakness.   PSYCHIATRIC: Denies depression, anxiety or mood swings.    PE:   APPEARANCE: Well nourished, well developed, Black or  female in no acute distress.  NODES: no cervical, supraclavicular, or inguinal lymphadenopathy  BREASTS: Symmetrical, no skin changes or visible lesions. No palpable masses, nipple discharge or adenopathy bilaterally.  ABDOMEN: Soft. No tenderness or masses. No distention. No hernias palpated. No CVA tenderness.  VULVA: No lesions. Normal external female genitalia.  URETHRAL MEATUS: Normal size and location, no lesions, no prolapse.  URETHRA: No masses, tenderness, or prolapse.  VAGINA: Moist. No lesions  or lacerations noted. No abnormal discharge present. No odor present.   CERVIX: No lesions or discharge. No cervical motion tenderness.   UTERUS: Normal size, regular shape, mobile, non-tender.  ADNEXA: No tenderness. No fullness or masses palpated in the adnexal regions.   ANUS PERINEUM: Normal.      Diagnosis:  1. Women's annual routine gynecological examination    2. Screening for cervical cancer    3. Screening for HPV (human papillomavirus)    4. Menopausal state        Plan:     Orders Placed This Encounter    HPV High Risk Genotypes, PCR    Liquid-Based Pap Smear, Screening     HPV/Pap  Recommendations for Vitamin D, dietary calcium, exercise. Declines DEXA scan this year with plans to perform at age 66.    Patient was counseled today on the new ACS guidelines for cervical cytology screening as well as the current recommendations for breast cancer screening. She was counseled to follow up with her PCP for other routine health maintenance. Counseling session lasted approximately 10 minutes, and all her questions were answered.    Follow-up with me in 1 year for routine exam       LISSETTE Gavin

## 2024-06-04 ENCOUNTER — LAB VISIT (OUTPATIENT)
Dept: LAB | Facility: HOSPITAL | Age: 64
End: 2024-06-04
Attending: STUDENT IN AN ORGANIZED HEALTH CARE EDUCATION/TRAINING PROGRAM
Payer: MEDICARE

## 2024-06-04 DIAGNOSIS — Z86.39 HISTORY OF HYPOTHYROIDISM: ICD-10-CM

## 2024-06-04 DIAGNOSIS — Z13.6 ENCOUNTER FOR SCREENING FOR CARDIOVASCULAR DISORDERS: ICD-10-CM

## 2024-06-04 DIAGNOSIS — E03.9 HYPOTHYROIDISM, UNSPECIFIED TYPE: ICD-10-CM

## 2024-06-04 DIAGNOSIS — Z00.00 ANNUAL PHYSICAL EXAM: ICD-10-CM

## 2024-06-04 LAB
ALBUMIN SERPL BCP-MCNC: 4.1 G/DL (ref 3.5–5.2)
ALP SERPL-CCNC: 82 U/L (ref 55–135)
ALT SERPL W/O P-5'-P-CCNC: 20 U/L (ref 10–44)
ANION GAP SERPL CALC-SCNC: 10 MMOL/L (ref 8–16)
AST SERPL-CCNC: 34 U/L (ref 10–40)
BASOPHILS # BLD AUTO: 0.03 K/UL (ref 0–0.2)
BASOPHILS NFR BLD: 0.5 % (ref 0–1.9)
BILIRUB SERPL-MCNC: 0.6 MG/DL (ref 0.1–1)
BUN SERPL-MCNC: 9 MG/DL (ref 8–23)
CALCIUM SERPL-MCNC: 10.3 MG/DL (ref 8.7–10.5)
CHLORIDE SERPL-SCNC: 104 MMOL/L (ref 95–110)
CHOLEST SERPL-MCNC: 188 MG/DL (ref 120–199)
CHOLEST/HDLC SERPL: 3 {RATIO} (ref 2–5)
CO2 SERPL-SCNC: 24 MMOL/L (ref 23–29)
CREAT SERPL-MCNC: 0.9 MG/DL (ref 0.5–1.4)
DIFFERENTIAL METHOD BLD: ABNORMAL
EOSINOPHIL # BLD AUTO: 0.1 K/UL (ref 0–0.5)
EOSINOPHIL NFR BLD: 1.2 % (ref 0–8)
ERYTHROCYTE [DISTWIDTH] IN BLOOD BY AUTOMATED COUNT: 15.5 % (ref 11.5–14.5)
EST. GFR  (NO RACE VARIABLE): >60 ML/MIN/1.73 M^2
ESTIMATED AVG GLUCOSE: 117 MG/DL (ref 68–131)
GLUCOSE SERPL-MCNC: 101 MG/DL (ref 70–110)
HBA1C MFR BLD: 5.7 % (ref 4–5.6)
HCT VFR BLD AUTO: 41.3 % (ref 37–48.5)
HDLC SERPL-MCNC: 63 MG/DL (ref 40–75)
HDLC SERPL: 33.5 % (ref 20–50)
HGB BLD-MCNC: 12.5 G/DL (ref 12–16)
IMM GRANULOCYTES # BLD AUTO: 0 K/UL (ref 0–0.04)
IMM GRANULOCYTES NFR BLD AUTO: 0 % (ref 0–0.5)
LDLC SERPL CALC-MCNC: 110.4 MG/DL (ref 63–159)
LYMPHOCYTES # BLD AUTO: 2.5 K/UL (ref 1–4.8)
LYMPHOCYTES NFR BLD: 42.8 % (ref 18–48)
MCH RBC QN AUTO: 21.2 PG (ref 27–31)
MCHC RBC AUTO-ENTMCNC: 30.3 G/DL (ref 32–36)
MCV RBC AUTO: 70 FL (ref 82–98)
MONOCYTES # BLD AUTO: 0.4 K/UL (ref 0.3–1)
MONOCYTES NFR BLD: 6.7 % (ref 4–15)
NEUTROPHILS # BLD AUTO: 2.9 K/UL (ref 1.8–7.7)
NEUTROPHILS NFR BLD: 48.8 % (ref 38–73)
NONHDLC SERPL-MCNC: 125 MG/DL
NRBC BLD-RTO: 0 /100 WBC
PLATELET # BLD AUTO: 190 K/UL (ref 150–450)
PMV BLD AUTO: ABNORMAL FL (ref 9.2–12.9)
POTASSIUM SERPL-SCNC: 4.2 MMOL/L (ref 3.5–5.1)
PROT SERPL-MCNC: 7.7 G/DL (ref 6–8.4)
RBC # BLD AUTO: 5.91 M/UL (ref 4–5.4)
SODIUM SERPL-SCNC: 138 MMOL/L (ref 136–145)
T4 FREE SERPL-MCNC: 1.16 NG/DL (ref 0.71–1.51)
TRIGL SERPL-MCNC: 73 MG/DL (ref 30–150)
TSH SERPL DL<=0.005 MIU/L-ACNC: 0.09 UIU/ML (ref 0.4–4)
WBC # BLD AUTO: 5.84 K/UL (ref 3.9–12.7)

## 2024-06-04 PROCEDURE — 84439 ASSAY OF FREE THYROXINE: CPT | Performed by: STUDENT IN AN ORGANIZED HEALTH CARE EDUCATION/TRAINING PROGRAM

## 2024-06-04 PROCEDURE — 83036 HEMOGLOBIN GLYCOSYLATED A1C: CPT | Performed by: STUDENT IN AN ORGANIZED HEALTH CARE EDUCATION/TRAINING PROGRAM

## 2024-06-04 PROCEDURE — 36415 COLL VENOUS BLD VENIPUNCTURE: CPT | Mod: PN | Performed by: STUDENT IN AN ORGANIZED HEALTH CARE EDUCATION/TRAINING PROGRAM

## 2024-06-04 PROCEDURE — 80053 COMPREHEN METABOLIC PANEL: CPT | Performed by: STUDENT IN AN ORGANIZED HEALTH CARE EDUCATION/TRAINING PROGRAM

## 2024-06-04 PROCEDURE — 85025 COMPLETE CBC W/AUTO DIFF WBC: CPT | Performed by: STUDENT IN AN ORGANIZED HEALTH CARE EDUCATION/TRAINING PROGRAM

## 2024-06-04 PROCEDURE — 84443 ASSAY THYROID STIM HORMONE: CPT | Performed by: STUDENT IN AN ORGANIZED HEALTH CARE EDUCATION/TRAINING PROGRAM

## 2024-06-04 PROCEDURE — 80061 LIPID PANEL: CPT | Performed by: STUDENT IN AN ORGANIZED HEALTH CARE EDUCATION/TRAINING PROGRAM

## 2024-06-11 ENCOUNTER — HOSPITAL ENCOUNTER (OUTPATIENT)
Dept: RADIOLOGY | Facility: HOSPITAL | Age: 64
Discharge: HOME | End: 2024-06-11
Attending: STUDENT IN AN ORGANIZED HEALTH CARE EDUCATION/TRAINING PROGRAM
Payer: MEDICARE

## 2024-06-11 DIAGNOSIS — Z12.39 ENCOUNTER FOR SCREENING FOR MALIGNANT NEOPLASM OF BREAST, UNSPECIFIED SCREENING MODALITY: ICD-10-CM

## 2024-06-11 DIAGNOSIS — Z12.31 ENCOUNTER FOR SCREENING MAMMOGRAM FOR MALIGNANT NEOPLASM OF BREAST: ICD-10-CM

## 2024-06-11 PROCEDURE — 77063 BREAST TOMOSYNTHESIS BI: CPT | Mod: TC

## 2024-06-11 PROCEDURE — 77067 SCR MAMMO BI INCL CAD: CPT | Mod: TC

## 2024-06-11 PROCEDURE — 77067 SCR MAMMO BI INCL CAD: CPT | Mod: 26,,, | Performed by: RADIOLOGY

## 2024-06-11 PROCEDURE — 77063 BREAST TOMOSYNTHESIS BI: CPT | Mod: 26,,, | Performed by: RADIOLOGY

## 2024-06-12 ENCOUNTER — TELEPHONE (OUTPATIENT)
Dept: PRIMARY CARE CLINIC | Facility: CLINIC | Age: 64
End: 2024-06-12
Payer: MEDICARE

## 2024-06-12 DIAGNOSIS — E03.9 HYPOTHYROIDISM, UNSPECIFIED TYPE: Primary | ICD-10-CM

## 2024-06-12 RX ORDER — LEVOTHYROXINE SODIUM 100 UG/1
100 TABLET ORAL
Qty: 30 TABLET | Refills: 5 | Status: SHIPPED | OUTPATIENT
Start: 2024-06-12 | End: 2025-06-12

## 2024-06-12 NOTE — TELEPHONE ENCOUNTER
----- Message from Price Calles sent at 6/12/2024  4:33 PM CDT -----  Regarding: Lab results Pt Call  Contact: [phone]  Type: Returning a call    Who left a message? Phuong    When did the practice call? Today    Comments: Lab Results

## 2024-07-16 ENCOUNTER — TELEPHONE (OUTPATIENT)
Dept: PRIMARY CARE CLINIC | Facility: CLINIC | Age: 64
End: 2024-07-16
Payer: MEDICARE

## 2024-07-16 DIAGNOSIS — E03.9 HYPOTHYROIDISM, UNSPECIFIED TYPE: Primary | ICD-10-CM

## 2024-07-16 RX ORDER — LEVOTHYROXINE SODIUM 112 UG/1
112 TABLET ORAL
Qty: 30 TABLET | Refills: 5 | Status: SHIPPED | OUTPATIENT
Start: 2024-07-16 | End: 2025-07-16

## 2024-07-16 NOTE — TELEPHONE ENCOUNTER
Okay, we will go back up on her medication to her previous dose. Please let patient know and tell her to get her thyroid levels checked in 6 weeks from the dose adjustment.  Orders for labs are in.

## 2024-07-16 NOTE — TELEPHONE ENCOUNTER
Patient contacted states that since the change in her levothyroxine she has been constipated she has increase water in take does plenty of fiber. Please advise will forward your message to Dr. Costello for review.

## 2024-07-16 NOTE — TELEPHONE ENCOUNTER
----- Message from Kim Golden sent at 7/16/2024  2:54 PM CDT -----  Contact: [phone]  Patient is returning a phone call.    Who left a message for the patient: Dr Costello's office    Does patient know what this is regarding:  no    Would you like a call back, or a response through your [facility] portal?:   phone    Comments: patient is requesting a call back. Stated that she is confused on what she have to do. Thank you

## 2024-07-16 NOTE — TELEPHONE ENCOUNTER
Per Dr. Costello voice and portal message left for patient.  Okay, we will go back up on her medication to her previous dose. Please let patient know and tell her to get her thyroid levels checked in 6 weeks from the dose adjustment. Orders for labs are in.  Contact the clinic with further questions.[phone]

## 2024-07-16 NOTE — TELEPHONE ENCOUNTER
----- Message from Price Calles sent at 7/16/2024 11:08 AM CDT -----  Regarding: RX Causing Issues  Contact: Pt [phone]  1MEDICALADVICE     Patient is calling for Medical Advice regarding: Pt called to report that the change to her thyroid medication has made it hard for her to use the bathroom. She wants to report it so her doctor knows and to get guidance.    How long has patient had these symptoms:    Pharmacy name and phone#:    Patient wants a call back or thru [facility]: Call

## 2024-07-16 NOTE — TELEPHONE ENCOUNTER
Patient called back to clarify orders given by Dr. Costello she is to go back to the original dosage on her thyroid and to keep her lab appointment in August to have her levels rechecked patient verbalized understanding and will keep the lab appointment.

## 2024-07-16 NOTE — TELEPHONE ENCOUNTER
----- Message from Price Calles sent at 7/16/2024 11:08 AM CDT -----  Regarding: RX Causing Issues  Contact: Pt [phone]  1MEDICALADVICE     Patient is calling for Medical Advice regarding: Pt called to report that the change to her thyroid medication has made it hard for her to use the bathroom. She wants to report it so her doctor knows and to get guidance.    How long has patient had these symptoms:    Pharmacy name and phone#:    Patient wants a call back or thru [facility]: Call

## 2024-07-22 ENCOUNTER — TELEPHONE (OUTPATIENT)
Dept: INTERNAL MEDICINE | Facility: CLINIC | Age: 64
End: 2024-07-22
Payer: MEDICARE

## 2024-07-22 NOTE — TELEPHONE ENCOUNTER
----- Message from Nadia Wilson sent at 7/22/2024 12:42 PM CDT -----  Contact: [phone]  Type: Returning a call    Who left a message?Nadia Zuniga LPN    When did the practice call? Last wednesday     Does patient know what this is regarding: constipation due medicine     Would the patient rather a call back or a response via [facility]? Call back     Comments:

## 2024-07-22 NOTE — TELEPHONE ENCOUNTER
Called patient. She reports even after increasing her levothyroxine dose she's still constipated. However, she has not been on this new dose for very long. Explained that it usually takes 4-6 weeks for the medication to kick in.  In the meantime, patient can continue prune juice and take Miralax for the constipation. We will recheck her TSH and T4 in 6 weeks.

## 2024-07-22 NOTE — TELEPHONE ENCOUNTER
Please ask patient more questions about her constipation - how long has it been going on for? What medication does she think is causing it? Has she tried miralax or fiber? Does she drink enough water?

## 2024-08-14 ENCOUNTER — LAB VISIT (OUTPATIENT)
Dept: LAB | Facility: HOSPITAL | Age: 64
End: 2024-08-14
Attending: STUDENT IN AN ORGANIZED HEALTH CARE EDUCATION/TRAINING PROGRAM
Payer: MEDICARE

## 2024-08-14 DIAGNOSIS — E03.9 HYPOTHYROIDISM, UNSPECIFIED TYPE: ICD-10-CM

## 2024-08-14 LAB
T4 FREE SERPL-MCNC: 0.95 NG/DL (ref 0.71–1.51)
TSH SERPL DL<=0.005 MIU/L-ACNC: 0.14 UIU/ML (ref 0.4–4)

## 2024-08-14 PROCEDURE — 84439 ASSAY OF FREE THYROXINE: CPT | Performed by: STUDENT IN AN ORGANIZED HEALTH CARE EDUCATION/TRAINING PROGRAM

## 2024-08-14 PROCEDURE — 36415 COLL VENOUS BLD VENIPUNCTURE: CPT | Mod: PN | Performed by: STUDENT IN AN ORGANIZED HEALTH CARE EDUCATION/TRAINING PROGRAM

## 2024-08-14 PROCEDURE — 84443 ASSAY THYROID STIM HORMONE: CPT | Performed by: STUDENT IN AN ORGANIZED HEALTH CARE EDUCATION/TRAINING PROGRAM

## 2024-08-22 ENCOUNTER — OFFICE VISIT (OUTPATIENT)
Dept: PRIMARY CARE CLINIC | Facility: CLINIC | Age: 64
End: 2024-08-22
Payer: MEDICARE

## 2024-08-22 VITALS
HEIGHT: 66 IN | WEIGHT: 153 LBS | SYSTOLIC BLOOD PRESSURE: 132 MMHG | DIASTOLIC BLOOD PRESSURE: 82 MMHG | BODY MASS INDEX: 24.59 KG/M2 | HEART RATE: 99 BPM | TEMPERATURE: 98 F | OXYGEN SATURATION: 72 %

## 2024-08-22 DIAGNOSIS — K59.09 CHRONIC CONSTIPATION: Primary | ICD-10-CM

## 2024-08-22 DIAGNOSIS — R68.89 CHANGE IN WEIGHT: ICD-10-CM

## 2024-08-22 DIAGNOSIS — E89.0 POSTABLATIVE HYPOTHYROIDISM: ICD-10-CM

## 2024-08-22 PROCEDURE — 99214 OFFICE O/P EST MOD 30 MIN: CPT | Mod: S$PBB,,, | Performed by: FAMILY MEDICINE

## 2024-08-22 PROCEDURE — 99999 PR PBB SHADOW E&M-EST. PATIENT-LVL IV: CPT | Mod: PBBFAC,,, | Performed by: FAMILY MEDICINE

## 2024-08-22 NOTE — ASSESSMENT & PLAN NOTE
When she decreased Levothyroxine dosage to 100mcg daily (from 112 since TSH was 0.1.36), she noted more constipation. She went back to 112 dosage to see if this would help (restarted it 5-6 weeks ago)    TSH is better 0.136, was 0.085    Did increase water & fiber. Drinks prune juice  2 x a day. Gardens, gym rides bike, fish .  No blood in stool. Negative FIT kit 2024. Never had Colonoscopy due to some fear.     She is open to waiting & seeing for 1-3 months if this resolves. If at any time it worsens, or she wants to . We can order Colonoscopy. Just let us know.     TSH in 3 mo (on Levo 112)

## 2024-08-22 NOTE — PROGRESS NOTES
Assessment:     1. Chronic constipation    2. Change in weight    3. Postablative hypothyroidism      Plan:     Chronic constipation  When she decreased Levothyroxine dosage to 100mcg daily (from 112 since TSH was 0.1.36), she noted more constipation. She went back to 112 dosage to see if this would help (restarted it 5-6 weeks ago)    TSH is better 0.136, was 0.085    Did increase water & fiber. Drinks prune juice  2 x a day. Gardens, gym rides bike, fish .  No blood in stool. Negative FIT kit 2024. Never had Colonoscopy due to some fear.     She is open to waiting & seeing for 1-3 months if this resolves. If at any time it worsens, or she wants to . We can order Colonoscopy. Just let us know.     TSH in 3 mo (on Levo 112)    Change in weight  With stress of 's cancer diagnosis Jan 2024, she rapidly lost 20#, has slowly gained it back. She is aware this could have affected her thyroid metabolism center.     Eating well now.     Postablative hypothyroidism  TSH 3 mo          HPI: Shara Combs is a 63 y.o. female with is here today for constipation    New since changing thyroid med dosage    Component      Latest Ref Rng 6/20/2023 6/4/2024 8/14/2024   TSH      0.400 - 4.000 uIU/mL 0.734  0.085 (L)  0.136 (L)    Free T4      0.71 - 1.51 ng/dL  1.16  0.95       Legend:  (L) Low        Current Outpatient Medications   Medication Instructions    atorvastatin (LIPITOR) 20 mg, Oral, Daily    doxepin (SINEQUAN) 100 mg, Oral, Nightly    levothyroxine (SYNTHROID) 112 mcg, Oral, Before breakfast    LORazepam (ATIVAN) 0.5 mg, Oral, 2 times daily    losartan (COZAAR) 25 mg, Oral, Daily    QUEtiapine (SEROQUEL) 100 mg, Oral, Nightly, at bedtime.     sertraline (ZOLOFT) 100 mg, Oral, 2 times daily    traZODone (DESYREL) 150 mg, Oral, Nightly, at bedtime.        Lab Results   Component Value Date    HGBA1C 5.7 (H) 06/04/2024    HGBA1C 5.6 06/20/2023    HGBA1C 6.0 (H) 06/07/2022     Lab Results   Component  "Value Date    MICALBCREAT 3.9 2019     Lab Results   Component Value Date    LDLCALC 110.4 2024    LDLCALC 122.0 2023    CHOL 188 2024    HDL 63 2024    TRIG 73 2024       Lab Results   Component Value Date     2024    K 4.2 2024     2024    CO2 24 2024     2024    BUN 9 2024    CREATININE 0.9 2024    CALCIUM 10.3 2024    PROT 7.7 2024    ALBUMIN 4.1 2024    BILITOT 0.6 2024    ALKPHOS 82 2024    AST 34 2024    ALT 20 2024    ANIONGAP 10 2024    ESTGFRAFRICA >60.0 2022    EGFRNONAA >60.0 2022    WBC 5.84 2024    HGB 12.5 2024    HGB 11.7 (L) 2023    HCT 41.3 2024    MCV 70 (L) 2024     2024    TSH 0.136 (L) 2024    HEPCAB Negative 2004       Lab Results   Component Value Date    FSH 3.7 2004    FERRITIN 72.7 2007    IRON 103 2007    TIBC 343 2007    FESATURATED 30 2007         Past Medical History:   Diagnosis Date    Anxiety 2016    psychiatrist  Mike Martin   [facility] [place] [address], & seeing therapist    Elevated glucose 2017    Grief 2020    Sister passed     Hypothyroidism     after tx with iodine     Past Surgical History:   Procedure Laterality Date     SECTION, CLASSIC       Vitals:    24 1305   BP: 132/82   Pulse: 99   Temp: 98.2 °F (36.8 °C)   TempSrc: Oral   SpO2: (!) 72%   Weight: 69.4 kg (153 lb)   Height: 5' 6" (1.676 m)   PainSc: 0-No pain     Wt Readings from Last 5 Encounters:   24 69.4 kg (153 lb)   24 65.5 kg (144 lb 6.4 oz)   24 64.8 kg (142 lb 13.7 oz)   23 72.1 kg (158 lb 15.2 oz)   22 76 kg (167 lb 8.8 oz)     Objective:   Physical Exam  Constitutional:       Appearance: She is well-developed.   Eyes:      Pupils: Pupils are equal, round, and reactive to light.   Neck:      " Comments: Nml thyroid  Cardiovascular:      Rate and Rhythm: Normal rate and regular rhythm.      Heart sounds: Normal heart sounds. No murmur heard.  Pulmonary:      Effort: Pulmonary effort is normal.      Breath sounds: Normal breath sounds. No wheezing.   Abdominal:      General: Bowel sounds are normal. There is no distension.      Palpations: Abdomen is soft. There is no mass.      Tenderness: There is no abdominal tenderness. There is no guarding or rebound.   Musculoskeletal:      Cervical back: Neck supple.   Skin:     General: Skin is warm and dry.   Neurological:      Mental Status: She is alert.   Psychiatric:         Behavior: Behavior normal.

## 2024-08-22 NOTE — ASSESSMENT & PLAN NOTE
With stress of 's cancer diagnosis Jan 2024, she rapidly lost 20#, has slowly gained it back. She is aware this could have affected her thyroid metabolism center.     Eating well now.

## 2024-11-22 ENCOUNTER — LAB VISIT (OUTPATIENT)
Dept: LAB | Facility: HOSPITAL | Age: 64
End: 2024-11-22
Attending: FAMILY MEDICINE
Payer: MEDICARE

## 2024-11-22 DIAGNOSIS — E89.0 POSTABLATIVE HYPOTHYROIDISM: ICD-10-CM

## 2024-11-22 LAB — TSH SERPL DL<=0.005 MIU/L-ACNC: 1.49 UIU/ML (ref 0.4–4)

## 2024-11-22 PROCEDURE — 36415 COLL VENOUS BLD VENIPUNCTURE: CPT | Mod: PN | Performed by: FAMILY MEDICINE

## 2024-11-22 PROCEDURE — 84443 ASSAY THYROID STIM HORMONE: CPT | Performed by: FAMILY MEDICINE

## 2025-02-22 DIAGNOSIS — Z00.00 ENCOUNTER FOR MEDICARE ANNUAL WELLNESS EXAM: ICD-10-CM

## 2025-06-05 ENCOUNTER — OFFICE VISIT (OUTPATIENT)
Dept: PRIMARY CARE CLINIC | Facility: CLINIC | Age: 65
End: 2025-06-05
Payer: MEDICARE

## 2025-06-05 ENCOUNTER — LAB VISIT (OUTPATIENT)
Dept: LAB | Facility: HOSPITAL | Age: 65
End: 2025-06-05
Attending: STUDENT IN AN ORGANIZED HEALTH CARE EDUCATION/TRAINING PROGRAM
Payer: MEDICARE

## 2025-06-05 VITALS
SYSTOLIC BLOOD PRESSURE: 132 MMHG | OXYGEN SATURATION: 97 % | HEIGHT: 66 IN | BODY MASS INDEX: 26.86 KG/M2 | DIASTOLIC BLOOD PRESSURE: 86 MMHG | WEIGHT: 167.13 LBS | HEART RATE: 71 BPM

## 2025-06-05 DIAGNOSIS — Z12.31 ENCOUNTER FOR SCREENING MAMMOGRAM FOR MALIGNANT NEOPLASM OF BREAST: ICD-10-CM

## 2025-06-05 DIAGNOSIS — E03.9 HYPOTHYROIDISM, UNSPECIFIED TYPE: ICD-10-CM

## 2025-06-05 DIAGNOSIS — R73.03 PREDIABETES: ICD-10-CM

## 2025-06-05 DIAGNOSIS — Z12.11 SCREENING FOR COLON CANCER: ICD-10-CM

## 2025-06-05 DIAGNOSIS — E89.0 POSTABLATIVE HYPOTHYROIDISM: ICD-10-CM

## 2025-06-05 DIAGNOSIS — I10 PRIMARY HYPERTENSION: ICD-10-CM

## 2025-06-05 DIAGNOSIS — Z00.00 ANNUAL PHYSICAL EXAM: ICD-10-CM

## 2025-06-05 DIAGNOSIS — F33.1 MDD (MAJOR DEPRESSIVE DISORDER), RECURRENT EPISODE, MODERATE: ICD-10-CM

## 2025-06-05 DIAGNOSIS — F41.9 ANXIETY: ICD-10-CM

## 2025-06-05 DIAGNOSIS — Z00.00 ANNUAL PHYSICAL EXAM: Primary | ICD-10-CM

## 2025-06-05 DIAGNOSIS — E78.2 MIXED HYPERLIPIDEMIA: ICD-10-CM

## 2025-06-05 DIAGNOSIS — I10 ESSENTIAL HYPERTENSION: ICD-10-CM

## 2025-06-05 LAB
ABSOLUTE EOSINOPHIL (OHS): 0.09 K/UL
ABSOLUTE MONOCYTE (OHS): 0.54 K/UL (ref 0.3–1)
ABSOLUTE NEUTROPHIL COUNT (OHS): 3.32 K/UL (ref 1.8–7.7)
ALBUMIN SERPL BCP-MCNC: 4 G/DL (ref 3.5–5.2)
ALP SERPL-CCNC: 84 UNIT/L (ref 40–150)
ALT SERPL W/O P-5'-P-CCNC: 20 UNIT/L (ref 10–44)
ANION GAP (OHS): 8 MMOL/L (ref 8–16)
AST SERPL-CCNC: 32 UNIT/L (ref 11–45)
BASOPHILS # BLD AUTO: 0.03 K/UL
BASOPHILS NFR BLD AUTO: 0.5 %
BILIRUB SERPL-MCNC: 0.4 MG/DL (ref 0.1–1)
BUN SERPL-MCNC: 14 MG/DL (ref 8–23)
CALCIUM SERPL-MCNC: 9.2 MG/DL (ref 8.7–10.5)
CHLORIDE SERPL-SCNC: 108 MMOL/L (ref 95–110)
CHOLEST SERPL-MCNC: 189 MG/DL (ref 120–199)
CHOLEST/HDLC SERPL: 3.4 {RATIO} (ref 2–5)
CO2 SERPL-SCNC: 26 MMOL/L (ref 23–29)
CREAT SERPL-MCNC: 0.9 MG/DL (ref 0.5–1.4)
EAG (OHS): 131 MG/DL (ref 68–131)
ERYTHROCYTE [DISTWIDTH] IN BLOOD BY AUTOMATED COUNT: 15.5 % (ref 11.5–14.5)
GFR SERPLBLD CREATININE-BSD FMLA CKD-EPI: >60 ML/MIN/1.73/M2
GLUCOSE SERPL-MCNC: 104 MG/DL (ref 70–110)
HBA1C MFR BLD: 6.2 % (ref 4–5.6)
HCT VFR BLD AUTO: 38.5 % (ref 37–48.5)
HDLC SERPL-MCNC: 55 MG/DL (ref 40–75)
HDLC SERPL: 29.1 % (ref 20–50)
HGB BLD-MCNC: 11.6 GM/DL (ref 12–16)
IMM GRANULOCYTES # BLD AUTO: 0.02 K/UL (ref 0–0.04)
IMM GRANULOCYTES NFR BLD AUTO: 0.3 % (ref 0–0.5)
LDLC SERPL CALC-MCNC: 116 MG/DL (ref 63–159)
LYMPHOCYTES # BLD AUTO: 2.38 K/UL (ref 1–4.8)
MCH RBC QN AUTO: 21 PG (ref 27–31)
MCHC RBC AUTO-ENTMCNC: 30.1 G/DL (ref 32–36)
MCV RBC AUTO: 70 FL (ref 82–98)
NONHDLC SERPL-MCNC: 134 MG/DL
NUCLEATED RBC (/100WBC) (OHS): 0 /100 WBC
PLATELET # BLD AUTO: 182 K/UL (ref 150–450)
PMV BLD AUTO: ABNORMAL FL
POTASSIUM SERPL-SCNC: 4.2 MMOL/L (ref 3.5–5.1)
PROT SERPL-MCNC: 7.4 GM/DL (ref 6–8.4)
RBC # BLD AUTO: 5.52 M/UL (ref 4–5.4)
RELATIVE EOSINOPHIL (OHS): 1.4 %
RELATIVE LYMPHOCYTE (OHS): 37.3 % (ref 18–48)
RELATIVE MONOCYTE (OHS): 8.5 % (ref 4–15)
RELATIVE NEUTROPHIL (OHS): 52 % (ref 38–73)
SODIUM SERPL-SCNC: 142 MMOL/L (ref 136–145)
T4 FREE SERPL-MCNC: 1.06 NG/DL (ref 0.71–1.51)
TRIGL SERPL-MCNC: 90 MG/DL (ref 30–150)
TSH SERPL-ACNC: 0.32 UIU/ML (ref 0.4–4)
WBC # BLD AUTO: 6.38 K/UL (ref 3.9–12.7)

## 2025-06-05 PROCEDURE — 83036 HEMOGLOBIN GLYCOSYLATED A1C: CPT

## 2025-06-05 PROCEDURE — 80053 COMPREHEN METABOLIC PANEL: CPT

## 2025-06-05 PROCEDURE — 80061 LIPID PANEL: CPT

## 2025-06-05 PROCEDURE — 36415 COLL VENOUS BLD VENIPUNCTURE: CPT | Mod: PN

## 2025-06-05 PROCEDURE — 99999 PR PBB SHADOW E&M-EST. PATIENT-LVL IV: CPT | Mod: PBBFAC,,, | Performed by: STUDENT IN AN ORGANIZED HEALTH CARE EDUCATION/TRAINING PROGRAM

## 2025-06-05 PROCEDURE — 85025 COMPLETE CBC W/AUTO DIFF WBC: CPT

## 2025-06-05 PROCEDURE — 84439 ASSAY OF FREE THYROXINE: CPT

## 2025-06-05 PROCEDURE — 84443 ASSAY THYROID STIM HORMONE: CPT

## 2025-06-05 RX ORDER — LOSARTAN POTASSIUM 25 MG/1
25 TABLET ORAL DAILY
Qty: 90 TABLET | Refills: 3 | Status: SHIPPED | OUTPATIENT
Start: 2025-06-05 | End: 2026-06-05

## 2025-06-05 RX ORDER — LEVOTHYROXINE SODIUM 112 UG/1
112 TABLET ORAL
Qty: 90 TABLET | Refills: 3 | Status: SHIPPED | OUTPATIENT
Start: 2025-06-05 | End: 2026-06-05

## 2025-06-05 RX ORDER — ATORVASTATIN CALCIUM 20 MG/1
20 TABLET, FILM COATED ORAL DAILY
Qty: 90 TABLET | Refills: 3 | Status: SHIPPED | OUTPATIENT
Start: 2025-06-05 | End: 2026-06-05

## 2025-06-12 ENCOUNTER — RESULTS FOLLOW-UP (OUTPATIENT)
Dept: PRIMARY CARE CLINIC | Facility: CLINIC | Age: 65
End: 2025-06-12